# Patient Record
Sex: MALE | Race: WHITE | NOT HISPANIC OR LATINO | Employment: PART TIME | URBAN - NONMETROPOLITAN AREA
[De-identification: names, ages, dates, MRNs, and addresses within clinical notes are randomized per-mention and may not be internally consistent; named-entity substitution may affect disease eponyms.]

---

## 2018-07-26 ENCOUNTER — HOSPITAL ENCOUNTER (EMERGENCY)
Facility: HOSPITAL | Age: 58
Discharge: HOME | End: 2018-07-26
Attending: EMERGENCY MEDICINE | Admitting: EMERGENCY MEDICINE

## 2018-07-26 ENCOUNTER — APPOINTMENT (OUTPATIENT)
Dept: GENERAL RADIOLOGY | Facility: HOSPITAL | Age: 58
End: 2018-07-26

## 2018-07-26 VITALS
BODY MASS INDEX: 28 KG/M2 | HEIGHT: 71 IN | TEMPERATURE: 98.3 F | SYSTOLIC BLOOD PRESSURE: 144 MMHG | DIASTOLIC BLOOD PRESSURE: 67 MMHG | HEART RATE: 90 BPM | WEIGHT: 200 LBS | OXYGEN SATURATION: 97 % | RESPIRATION RATE: 14 BRPM

## 2018-07-26 DIAGNOSIS — S80.11XA CONTUSION OF LEG, RIGHT, INITIAL ENCOUNTER: Primary | ICD-10-CM

## 2018-07-26 PROCEDURE — 99283 EMERGENCY DEPT VISIT LOW MDM: CPT

## 2018-07-26 PROCEDURE — 73590 X-RAY EXAM OF LOWER LEG: CPT

## 2018-07-26 RX ORDER — CELECOXIB 200 MG/1
200 CAPSULE ORAL DAILY
COMMUNITY

## 2018-07-26 RX ORDER — GABAPENTIN 600 MG/1
600 TABLET ORAL 3 TIMES DAILY
COMMUNITY
End: 2022-03-23

## 2018-07-26 RX ORDER — IBUPROFEN 600 MG/1
600 TABLET ORAL EVERY 6 HOURS PRN
Qty: 60 TABLET | Refills: 0 | Status: SHIPPED | OUTPATIENT
Start: 2018-07-26 | End: 2018-12-14

## 2018-07-26 RX ORDER — BACLOFEN 10 MG/1
10 TABLET ORAL 2 TIMES DAILY
COMMUNITY

## 2018-07-26 RX ORDER — DULOXETIN HYDROCHLORIDE 60 MG/1
60 CAPSULE, DELAYED RELEASE ORAL DAILY
COMMUNITY
End: 2021-04-01

## 2018-11-13 ENCOUNTER — HOSPITAL ENCOUNTER (EMERGENCY)
Facility: HOSPITAL | Age: 58
Discharge: HOME | End: 2018-11-14
Attending: EMERGENCY MEDICINE | Admitting: EMERGENCY MEDICINE

## 2018-11-13 ENCOUNTER — APPOINTMENT (OUTPATIENT)
Dept: CT IMAGING | Facility: HOSPITAL | Age: 58
End: 2018-11-13

## 2018-11-13 DIAGNOSIS — N20.0 KIDNEY STONE: Primary | ICD-10-CM

## 2018-11-13 LAB
ALBUMIN SERPL-MCNC: 5.4 G/DL (ref 3.5–5)
ALBUMIN/GLOB SERPL: 1.7 G/DL (ref 1–2)
ALP SERPL-CCNC: 98 U/L (ref 38–126)
ALT SERPL W P-5'-P-CCNC: 47 U/L (ref 13–69)
ANION GAP SERPL CALCULATED.3IONS-SCNC: 18.4 MMOL/L (ref 10–20)
AST SERPL-CCNC: 47 U/L (ref 15–46)
BASOPHILS # BLD AUTO: 0.03 10*3/MM3 (ref 0–0.2)
BASOPHILS NFR BLD AUTO: 0.3 % (ref 0–2.5)
BILIRUB SERPL-MCNC: 0.9 MG/DL (ref 0.2–1.3)
BUN BLD-MCNC: 20 MG/DL (ref 7–20)
BUN/CREAT SERPL: 15.4 (ref 6.3–21.9)
CALCIUM SPEC-SCNC: 11.1 MG/DL (ref 8.4–10.2)
CHLORIDE SERPL-SCNC: 102 MMOL/L (ref 98–107)
CO2 SERPL-SCNC: 21 MMOL/L (ref 26–30)
CREAT BLD-MCNC: 1.3 MG/DL (ref 0.6–1.3)
DEPRECATED RDW RBC AUTO: 41.2 FL (ref 37–54)
EOSINOPHIL # BLD AUTO: 0.01 10*3/MM3 (ref 0–0.7)
EOSINOPHIL NFR BLD AUTO: 0.1 % (ref 0–7)
ERYTHROCYTE [DISTWIDTH] IN BLOOD BY AUTOMATED COUNT: 11.9 % (ref 11.5–14.5)
GFR SERPL CREATININE-BSD FRML MDRD: 57 ML/MIN/1.73
GLOBULIN UR ELPH-MCNC: 3.1 GM/DL
GLUCOSE BLD-MCNC: 118 MG/DL (ref 74–98)
HCT VFR BLD AUTO: 45.6 % (ref 42–52)
HGB BLD-MCNC: 15.9 G/DL (ref 14–18)
IMM GRANULOCYTES # BLD: 0.05 10*3/MM3 (ref 0–0.06)
IMM GRANULOCYTES NFR BLD: 0.4 % (ref 0–0.6)
LIPASE SERPL-CCNC: 88 U/L (ref 23–300)
LYMPHOCYTES # BLD AUTO: 1.58 10*3/MM3 (ref 0.6–3.4)
LYMPHOCYTES NFR BLD AUTO: 13.5 % (ref 10–50)
MCH RBC QN AUTO: 32.6 PG (ref 27–31)
MCHC RBC AUTO-ENTMCNC: 34.9 G/DL (ref 30–37)
MCV RBC AUTO: 93.6 FL (ref 80–94)
MONOCYTES # BLD AUTO: 0.74 10*3/MM3 (ref 0–0.9)
MONOCYTES NFR BLD AUTO: 6.3 % (ref 0–12)
NEUTROPHILS # BLD AUTO: 9.28 10*3/MM3 (ref 2–6.9)
NEUTROPHILS NFR BLD AUTO: 79.4 % (ref 37–80)
NRBC BLD MANUAL-RTO: 0 /100 WBC (ref 0–0)
PLATELET # BLD AUTO: 339 10*3/MM3 (ref 130–400)
PMV BLD AUTO: 8.3 FL (ref 6–12)
POTASSIUM BLD-SCNC: 4.4 MMOL/L (ref 3.5–5.1)
PROT SERPL-MCNC: 8.5 G/DL (ref 6.3–8.2)
RBC # BLD AUTO: 4.87 10*6/MM3 (ref 4.7–6.1)
SODIUM BLD-SCNC: 137 MMOL/L (ref 137–145)
WBC NRBC COR # BLD: 11.69 10*3/MM3 (ref 4.8–10.8)

## 2018-11-13 PROCEDURE — 83690 ASSAY OF LIPASE: CPT | Performed by: EMERGENCY MEDICINE

## 2018-11-13 PROCEDURE — 74176 CT ABD & PELVIS W/O CONTRAST: CPT

## 2018-11-13 PROCEDURE — 85025 COMPLETE CBC W/AUTO DIFF WBC: CPT | Performed by: EMERGENCY MEDICINE

## 2018-11-13 PROCEDURE — 25010000002 KETOROLAC TROMETHAMINE PER 15 MG: Performed by: EMERGENCY MEDICINE

## 2018-11-13 PROCEDURE — 96374 THER/PROPH/DIAG INJ IV PUSH: CPT

## 2018-11-13 PROCEDURE — 99284 EMERGENCY DEPT VISIT MOD MDM: CPT

## 2018-11-13 PROCEDURE — 96375 TX/PRO/DX INJ NEW DRUG ADDON: CPT

## 2018-11-13 PROCEDURE — 80053 COMPREHEN METABOLIC PANEL: CPT | Performed by: EMERGENCY MEDICINE

## 2018-11-13 PROCEDURE — 25010000002 MORPHINE PER 10 MG: Performed by: EMERGENCY MEDICINE

## 2018-11-13 PROCEDURE — 96361 HYDRATE IV INFUSION ADD-ON: CPT

## 2018-11-13 PROCEDURE — 25010000002 ONDANSETRON PER 1 MG: Performed by: EMERGENCY MEDICINE

## 2018-11-13 RX ORDER — SODIUM CHLORIDE 0.9 % (FLUSH) 0.9 %
10 SYRINGE (ML) INJECTION AS NEEDED
Status: DISCONTINUED | OUTPATIENT
Start: 2018-11-13 | End: 2018-11-14 | Stop reason: HOSPADM

## 2018-11-13 RX ORDER — ONDANSETRON 2 MG/ML
4 INJECTION INTRAMUSCULAR; INTRAVENOUS ONCE
Status: COMPLETED | OUTPATIENT
Start: 2018-11-13 | End: 2018-11-13

## 2018-11-13 RX ORDER — MORPHINE SULFATE 4 MG/ML
4 INJECTION, SOLUTION INTRAMUSCULAR; INTRAVENOUS ONCE
Status: COMPLETED | OUTPATIENT
Start: 2018-11-13 | End: 2018-11-13

## 2018-11-13 RX ORDER — KETOROLAC TROMETHAMINE 30 MG/ML
10 INJECTION, SOLUTION INTRAMUSCULAR; INTRAVENOUS ONCE
Status: COMPLETED | OUTPATIENT
Start: 2018-11-13 | End: 2018-11-13

## 2018-11-13 RX ADMIN — MORPHINE SULFATE 4 MG: 4 INJECTION, SOLUTION INTRAMUSCULAR; INTRAVENOUS at 23:21

## 2018-11-13 RX ADMIN — ONDANSETRON 4 MG: 2 INJECTION INTRAMUSCULAR; INTRAVENOUS at 23:21

## 2018-11-13 RX ADMIN — KETOROLAC TROMETHAMINE 10 MG: 30 INJECTION, SOLUTION INTRAMUSCULAR at 23:21

## 2018-11-13 RX ADMIN — SODIUM CHLORIDE 1000 ML: 9 INJECTION, SOLUTION INTRAVENOUS at 23:21

## 2018-11-14 VITALS
RESPIRATION RATE: 16 BRPM | OXYGEN SATURATION: 95 % | BODY MASS INDEX: 15.26 KG/M2 | HEIGHT: 71 IN | SYSTOLIC BLOOD PRESSURE: 117 MMHG | HEART RATE: 81 BPM | DIASTOLIC BLOOD PRESSURE: 71 MMHG | WEIGHT: 109 LBS | TEMPERATURE: 97.2 F

## 2018-11-14 LAB
BACTERIA UR QL AUTO: ABNORMAL /HPF
BILIRUB UR QL STRIP: NEGATIVE
CLARITY UR: CLEAR
COLOR UR: YELLOW
GLUCOSE UR STRIP-MCNC: NEGATIVE MG/DL
HGB UR QL STRIP.AUTO: ABNORMAL
HOLD SPECIMEN: NORMAL
HOLD SPECIMEN: NORMAL
HYALINE CASTS UR QL AUTO: ABNORMAL /LPF
KETONES UR QL STRIP: ABNORMAL
LEUKOCYTE ESTERASE UR QL STRIP.AUTO: NEGATIVE
NITRITE UR QL STRIP: NEGATIVE
PH UR STRIP.AUTO: 7.5 [PH] (ref 5–8)
PROT UR QL STRIP: NEGATIVE
RBC # UR: ABNORMAL /HPF
REF LAB TEST METHOD: ABNORMAL
SP GR UR STRIP: 1.01 (ref 1–1.03)
SQUAMOUS #/AREA URNS HPF: ABNORMAL /HPF
UROBILINOGEN UR QL STRIP: ABNORMAL
WBC UR QL AUTO: ABNORMAL /HPF
WHOLE BLOOD HOLD SPECIMEN: NORMAL
WHOLE BLOOD HOLD SPECIMEN: NORMAL

## 2018-11-14 PROCEDURE — 81001 URINALYSIS AUTO W/SCOPE: CPT | Performed by: EMERGENCY MEDICINE

## 2018-11-14 PROCEDURE — 25010000002 KETOROLAC TROMETHAMINE PER 15 MG: Performed by: EMERGENCY MEDICINE

## 2018-11-14 PROCEDURE — 96376 TX/PRO/DX INJ SAME DRUG ADON: CPT

## 2018-11-14 PROCEDURE — 25010000002 MORPHINE PER 10 MG: Performed by: EMERGENCY MEDICINE

## 2018-11-14 RX ORDER — OXYCODONE HYDROCHLORIDE AND ACETAMINOPHEN 5; 325 MG/1; MG/1
1 TABLET ORAL EVERY 4 HOURS PRN
Qty: 12 TABLET | Refills: 0 | Status: SHIPPED | OUTPATIENT
Start: 2018-11-14 | End: 2018-12-14

## 2018-11-14 RX ORDER — NAPROXEN 500 MG/1
500 TABLET ORAL 2 TIMES DAILY PRN
Qty: 14 TABLET | Refills: 0 | Status: ON HOLD | OUTPATIENT
Start: 2018-11-14 | End: 2018-12-14

## 2018-11-14 RX ORDER — TAMSULOSIN HYDROCHLORIDE 0.4 MG/1
1 CAPSULE ORAL DAILY
Qty: 30 CAPSULE | Refills: 0 | Status: SHIPPED | OUTPATIENT
Start: 2018-11-14 | End: 2018-12-14

## 2018-11-14 RX ORDER — MORPHINE SULFATE 4 MG/ML
4 INJECTION, SOLUTION INTRAMUSCULAR; INTRAVENOUS ONCE
Status: COMPLETED | OUTPATIENT
Start: 2018-11-14 | End: 2018-11-14

## 2018-11-14 RX ORDER — KETOROLAC TROMETHAMINE 30 MG/ML
10 INJECTION, SOLUTION INTRAMUSCULAR; INTRAVENOUS ONCE
Status: COMPLETED | OUTPATIENT
Start: 2018-11-14 | End: 2018-11-14

## 2018-11-14 RX ORDER — ONDANSETRON 4 MG/1
4 TABLET, ORALLY DISINTEGRATING ORAL EVERY 8 HOURS PRN
Qty: 12 TABLET | Refills: 0 | Status: SHIPPED | OUTPATIENT
Start: 2018-11-14 | End: 2021-04-01

## 2018-11-14 RX ADMIN — MORPHINE SULFATE 4 MG: 4 INJECTION, SOLUTION INTRAMUSCULAR; INTRAVENOUS at 02:24

## 2018-11-14 RX ADMIN — KETOROLAC TROMETHAMINE 10 MG: 30 INJECTION, SOLUTION INTRAMUSCULAR at 02:23

## 2018-11-14 NOTE — ED PROVIDER NOTES
TRIAGE CHIEF COMPLAINT:     Nursing and triage notes reviewed    Chief Complaint   Patient presents with   • Flank Pain   • Testicle Pain      HPI: Toribio Dunaway is a 58 y.o. male who presents to the emergency department complaining of left-sided flank pain.  For approximately the past 12 hours patient has had a sharp stabbing left flank pain radiating around to the groin and left testicle.  Patient has had nausea with nonbilious emesis.  Has had urinary frequency as well.  Denies fevers or chills.  Has never had similar symptoms in the past.  Denies diarrhea.  No chest pain or shortness of breath.     REVIEW OF SYSTEMS: All other systems reviewed and are negative     PAST MEDICAL HISTORY:   Past Medical History:   Diagnosis Date   • GERD (gastroesophageal reflux disease)    • Injury of back    • RA (rheumatoid arthritis) (CMS/[facility])         FAMILY HISTORY:   History reviewed. No pertinent family history.     SOCIAL HISTORY:   Social History     Socioeconomic History   • Marital status:      Spouse name: Not on file   • Number of children: Not on file   • Years of education: Not on file   • Highest education level: Not on file   Social Needs   • Financial resource strain: Not on file   • Food insecurity - worry: Not on file   • Food insecurity - inability: Not on file   • Transportation needs - medical: Not on file   • Transportation needs - non-medical: Not on file   Occupational History   • Not on file   Tobacco Use   • Smoking status: Never Smoker   • Smokeless tobacco: Never Used   Substance and Sexual Activity   • Alcohol use: No   • Drug use: No   • Sexual activity: Not on file   Other Topics Concern   • Not on file   Social History Narrative   • Not on file        SURGICAL HISTORY:   Past Surgical History:   Procedure Laterality Date   • COLONOSCOPY     • LASIK     • TESTICLE SURGERY          CURRENT MEDICATIONS:      Medication List      ASK your doctor about these medications    baclofen 10 MG  tablet  Commonly known as:  LIORESAL     celecoxib 200 MG capsule  Commonly known as:  CeleBREX     DULoxetine 60 MG capsule  Commonly known as:  CYMBALTA     gabapentin 600 MG tablet  Commonly known as:  NEURONTIN     ibuprofen 600 MG tablet  Commonly known as:  ADVIL,MOTRIN  Take 1 tablet by mouth Every 6 (Six) Hours As Needed for Mild Pain .     TRAMADOL HCL PO           ALLERGIES: Patient has no known allergies.     PHYSICAL EXAM:   VITAL SIGNS:   Vitals:    11/13/18 2114   BP: 124/81   Pulse: 93   Resp: 22   Temp: 97.2 °F (36.2 °C)   SpO2: 99%      CONSTITUTIONAL: Awake, oriented, appears non-toxic but very uncomfortable  HENT: Atraumatic, normocephalic, oral mucosa pink and moist, airway patent.   EYES: Conjunctiva clear  NECK: Trachea midline  CARDIOVASCULAR: Normal heart rate, Normal rhythm, No murmurs, rubs, gallops   PULMONARY/CHEST: Clear to auscultation, no rhonchi, wheezes, or rales. Symmetrical breath sounds.  ABDOMINAL: Non-distended, soft, left flank tenderness to palpation - no rebound or guarding.  NEUROLOGIC: Non-focal, moving all four extremities, no gross sensory or motor deficits.   EXTREMITIES: No clubbing, cyanosis, or edema   SKIN: Warm, Dry, No erythema, No rash     ED COURSE / MEDICAL DECISION MAKING:   Toribio Dunaway is a 58 y.o. male who presents to the emergency department for evaluation of left flank pain.  Patient appears very uncomfortable on arrival in the emergency department but is nontoxic.  Vital signs are stable.  Exam does reveal left flank tenderness to palpation.  Given his presentation I suspect a likely kidney stone.  We'll obtain a CT scan as well as labs for further evaluation.  We'll treat patient with pain and nausea medication.  Laboratory tests reveal some hematuria but otherwise are largely unremarkable.  CT scan reveals a 2 mm distal left ureteral stone which I believe is the cause of patient's symptoms.  He was given a dose of Toradol and morphine with complete  resolution of his pain.  Patient able to tolerate by mouth without difficulty.  We'll continue to treat patient symptomatically as an outpatient.  Return precautions discussed.    DECISION TO DISCHARGE/ADMIT: see ED care timeline     FINAL IMPRESSION:   1 -- kidney stone   2 --   3 --     Electronically signed by: Makenna Barlow MD, 11/13/2018 11:14 PM       Makenna Barlow MD  11/14/18 0145

## 2018-11-14 NOTE — ED NOTES
Pt still unable to void; refuses in/out cath; will continue to monitor      Imelda Mensah, MIRIAN  11/14/18 0037

## 2018-11-14 NOTE — ED NOTES
Pt has attempted to urinate using urinal at bedside; states he is unable to and is in too much pain at this time; will monitor and try again in 10-15 mins;     Imelda Mensah, RN  11/13/18 5824

## 2018-12-14 ENCOUNTER — APPOINTMENT (OUTPATIENT)
Dept: GENERAL RADIOLOGY | Facility: HOSPITAL | Age: 58
End: 2018-12-14

## 2018-12-14 ENCOUNTER — HOSPITAL ENCOUNTER (INPATIENT)
Facility: HOSPITAL | Age: 58
LOS: 4 days | Discharge: HOME | End: 2018-12-18
Attending: EMERGENCY MEDICINE | Admitting: FAMILY MEDICINE

## 2018-12-14 DIAGNOSIS — J18.9 COMMUNITY ACQUIRED PNEUMONIA OF LEFT LOWER LOBE OF LUNG: Primary | ICD-10-CM

## 2018-12-14 PROBLEM — N17.9 ACUTE KIDNEY INJURY (HCC): Status: ACTIVE | Noted: 2018-12-14

## 2018-12-14 PROBLEM — M06.9 RA (RHEUMATOID ARTHRITIS) (HCC): Status: ACTIVE | Noted: 2018-12-14

## 2018-12-14 PROBLEM — E86.0 DEHYDRATION: Status: ACTIVE | Noted: 2018-12-14

## 2018-12-14 PROBLEM — K21.9 GERD (GASTROESOPHAGEAL REFLUX DISEASE): Status: ACTIVE | Noted: 2018-12-14

## 2018-12-14 LAB
ALBUMIN SERPL-MCNC: 4.5 G/DL (ref 3.5–5)
ALBUMIN/GLOB SERPL: 1.3 G/DL (ref 1–2)
ALP SERPL-CCNC: 123 U/L (ref 38–126)
ALT SERPL W P-5'-P-CCNC: 38 U/L (ref 13–69)
ANION GAP SERPL CALCULATED.3IONS-SCNC: 21.1 MMOL/L (ref 10–20)
AST SERPL-CCNC: 46 U/L (ref 15–46)
BASOPHILS # BLD AUTO: 0.06 10*3/MM3 (ref 0–0.2)
BASOPHILS NFR BLD AUTO: 0.3 % (ref 0–2.5)
BILIRUB SERPL-MCNC: 1.3 MG/DL (ref 0.2–1.3)
BUN BLD-MCNC: 46 MG/DL (ref 7–20)
BUN/CREAT SERPL: 14.4 (ref 6.3–21.9)
CALCIUM SPEC-SCNC: 9.9 MG/DL (ref 8.4–10.2)
CHLORIDE SERPL-SCNC: 99 MMOL/L (ref 98–107)
CO2 SERPL-SCNC: 22 MMOL/L (ref 26–30)
CREAT BLD-MCNC: 3.2 MG/DL (ref 0.6–1.3)
D-LACTATE SERPL-SCNC: 0.7 MMOL/L (ref 0.5–2)
DEPRECATED RDW RBC AUTO: 42.5 FL (ref 37–54)
EOSINOPHIL # BLD AUTO: 0.01 10*3/MM3 (ref 0–0.7)
EOSINOPHIL NFR BLD AUTO: 0 % (ref 0–7)
ERYTHROCYTE [DISTWIDTH] IN BLOOD BY AUTOMATED COUNT: 12.2 % (ref 11.5–14.5)
FLUAV AG NPH QL: NEGATIVE
FLUBV AG NPH QL IA: NEGATIVE
GFR SERPL CREATININE-BSD FRML MDRD: 20 ML/MIN/1.73
GLOBULIN UR ELPH-MCNC: 3.6 GM/DL
GLUCOSE BLD-MCNC: 115 MG/DL (ref 74–98)
HCT VFR BLD AUTO: 35 % (ref 42–52)
HGB BLD-MCNC: 12 G/DL (ref 14–18)
IMM GRANULOCYTES # BLD: 0.42 10*3/MM3 (ref 0–0.06)
IMM GRANULOCYTES NFR BLD: 2 % (ref 0–0.6)
LYMPHOCYTES # BLD AUTO: 0.74 10*3/MM3 (ref 0.6–3.4)
LYMPHOCYTES NFR BLD AUTO: 3.5 % (ref 10–50)
MCH RBC QN AUTO: 32.3 PG (ref 27–31)
MCHC RBC AUTO-ENTMCNC: 34.3 G/DL (ref 30–37)
MCV RBC AUTO: 94.1 FL (ref 80–94)
MONOCYTES # BLD AUTO: 1.46 10*3/MM3 (ref 0–0.9)
MONOCYTES NFR BLD AUTO: 6.9 % (ref 0–12)
NEUTROPHILS # BLD AUTO: 18.32 10*3/MM3 (ref 2–6.9)
NEUTROPHILS NFR BLD AUTO: 87.3 % (ref 37–80)
NRBC BLD MANUAL-RTO: 0 /100 WBC (ref 0–0)
PLATELET # BLD AUTO: 343 10*3/MM3 (ref 130–400)
PMV BLD AUTO: 8.5 FL (ref 6–12)
POTASSIUM BLD-SCNC: 4.1 MMOL/L (ref 3.5–5.1)
PROT SERPL-MCNC: 8.1 G/DL (ref 6.3–8.2)
RBC # BLD AUTO: 3.72 10*6/MM3 (ref 4.7–6.1)
SODIUM BLD-SCNC: 138 MMOL/L (ref 137–145)
WBC NRBC COR # BLD: 21.01 10*3/MM3 (ref 4.8–10.8)

## 2018-12-14 PROCEDURE — 99219 PR INITIAL OBSERVATION CARE/DAY 50 MINUTES: CPT | Performed by: INTERNAL MEDICINE

## 2018-12-14 PROCEDURE — G0378 HOSPITAL OBSERVATION PER HR: HCPCS

## 2018-12-14 PROCEDURE — 94799 UNLISTED PULMONARY SVC/PX: CPT

## 2018-12-14 PROCEDURE — 25010000002 AZITHROMYCIN: Performed by: NURSE PRACTITIONER

## 2018-12-14 PROCEDURE — 25010000002 CEFTRIAXONE SODIUM-DEXTROSE 1-3.74 GM-%(50ML) RECONSTITUTED SOLUTION: Performed by: NURSE PRACTITIONER

## 2018-12-14 PROCEDURE — 85025 COMPLETE CBC W/AUTO DIFF WBC: CPT | Performed by: NURSE PRACTITIONER

## 2018-12-14 PROCEDURE — 83605 ASSAY OF LACTIC ACID: CPT | Performed by: NURSE PRACTITIONER

## 2018-12-14 PROCEDURE — 87040 BLOOD CULTURE FOR BACTERIA: CPT | Performed by: NURSE PRACTITIONER

## 2018-12-14 PROCEDURE — 87804 INFLUENZA ASSAY W/OPTIC: CPT | Performed by: NURSE PRACTITIONER

## 2018-12-14 PROCEDURE — 71046 X-RAY EXAM CHEST 2 VIEWS: CPT

## 2018-12-14 PROCEDURE — 94640 AIRWAY INHALATION TREATMENT: CPT

## 2018-12-14 PROCEDURE — 99284 EMERGENCY DEPT VISIT MOD MDM: CPT

## 2018-12-14 PROCEDURE — 87040 BLOOD CULTURE FOR BACTERIA: CPT

## 2018-12-14 PROCEDURE — 80053 COMPREHEN METABOLIC PANEL: CPT | Performed by: NURSE PRACTITIONER

## 2018-12-14 RX ORDER — DULOXETIN HYDROCHLORIDE 30 MG/1
60 CAPSULE, DELAYED RELEASE ORAL DAILY
Status: DISCONTINUED | OUTPATIENT
Start: 2018-12-15 | End: 2018-12-18 | Stop reason: HOSPADM

## 2018-12-14 RX ORDER — SODIUM CHLORIDE 9 MG/ML
75 INJECTION, SOLUTION INTRAVENOUS CONTINUOUS
Status: DISCONTINUED | OUTPATIENT
Start: 2018-12-14 | End: 2018-12-18

## 2018-12-14 RX ORDER — BACLOFEN 10 MG/1
10 TABLET ORAL 2 TIMES DAILY
Status: DISCONTINUED | OUTPATIENT
Start: 2018-12-14 | End: 2018-12-18 | Stop reason: HOSPADM

## 2018-12-14 RX ORDER — CEFTRIAXONE 1 G/50ML
1 INJECTION, SOLUTION INTRAVENOUS EVERY 24 HOURS
Status: DISCONTINUED | OUTPATIENT
Start: 2018-12-15 | End: 2018-12-16

## 2018-12-14 RX ORDER — ONDANSETRON 4 MG/1
4 TABLET, ORALLY DISINTEGRATING ORAL EVERY 8 HOURS PRN
Status: DISCONTINUED | OUTPATIENT
Start: 2018-12-14 | End: 2018-12-18 | Stop reason: HOSPADM

## 2018-12-14 RX ORDER — IPRATROPIUM BROMIDE AND ALBUTEROL SULFATE 2.5; .5 MG/3ML; MG/3ML
3 SOLUTION RESPIRATORY (INHALATION)
Status: DISCONTINUED | OUTPATIENT
Start: 2018-12-14 | End: 2018-12-18

## 2018-12-14 RX ORDER — CEFTRIAXONE 1 G/50ML
1 INJECTION, SOLUTION INTRAVENOUS ONCE
Status: COMPLETED | OUTPATIENT
Start: 2018-12-14 | End: 2018-12-14

## 2018-12-14 RX ADMIN — IPRATROPIUM BROMIDE AND ALBUTEROL SULFATE 3 ML: .5; 3 SOLUTION RESPIRATORY (INHALATION) at 22:41

## 2018-12-14 RX ADMIN — CEFTRIAXONE 1 G: 1 INJECTION, SOLUTION INTRAVENOUS at 18:56

## 2018-12-14 RX ADMIN — AZITHROMYCIN 500 MG: 500 INJECTION, POWDER, LYOPHILIZED, FOR SOLUTION INTRAVENOUS at 20:08

## 2018-12-14 RX ADMIN — SODIUM CHLORIDE 2445 ML: 9 INJECTION, SOLUTION INTRAVENOUS at 18:56

## 2018-12-14 RX ADMIN — SODIUM CHLORIDE 1000 ML: 9 INJECTION, SOLUTION INTRAVENOUS at 17:37

## 2018-12-14 RX ADMIN — ONDANSETRON 4 MG: 4 TABLET, ORALLY DISINTEGRATING ORAL at 21:26

## 2018-12-14 RX ADMIN — BACLOFEN 10 MG: 10 TABLET ORAL at 21:26

## 2018-12-14 NOTE — ED PROVIDER NOTES
"Subjective   58-year-old male patient sent the emergency room with a two-week history of productive cough, low-grade fever, decreased by mouth intake, generalized body aches.  She denies any nausea or vomiting.  Office productive of thick yellow sometimes brown sputum.  Occasionally streaked with hemoptysis.  No sick contacts that he is aware of.  He is unsure as to how high his fevers as he has not yet taken it.  He just \"knows have had one\".  He denies cardiac chest pain.  Does complain of some pleuritic pain with deep inspiration.  No Palpitations.            Review of Systems  A 10 point review of systems including constitutional, ENT, cardiovascular, respiratory, GI, , musculoskeletal, neuro, skin, psychiatric was performed and it was negative with exception lies body aches, productive cough, low-grade fever decreased intake   Past Medical History:   Diagnosis Date   • GERD (gastroesophageal reflux disease)    • Injury of back    • Kidney stone    • RA (rheumatoid arthritis) (CMS/[facility])        No Known Allergies    Past Surgical History:   Procedure Laterality Date   • COLONOSCOPY     • LASIK     • TESTICLE SURGERY         History reviewed. No pertinent family history.    Social History     Socioeconomic History   • Marital status:      Spouse name: Not on file   • Number of children: Not on file   • Years of education: Not on file   • Highest education level: Not on file   Tobacco Use   • Smoking status: Never Smoker   • Smokeless tobacco: Never Used   Substance and Sexual Activity   • Alcohol use: No   • Drug use: No           Objective   Physical Exam  Constitutional: The patient is cooperative. Appears well-developed and well-nourished.  Ill-appearing but nontoxic  Psychological: Alert and oriented x3. No abnormalities of mood affect.  Eyes: Pupils are equal round reactive to light.  Conjunctiva are within normal limits.  ENT: Oropharynx is clear.  Neck: The neck is supple.  No crepitus.  Full range " of motion without pain.  Chest: There is no tenderness to the chest wall.  Respiratory: Respiratory effort was normal.  There is no rales, wheezes, or rhonchi.  There is no stridor.  Air entry is equal.  Cardiovascular: Regular rate and rhythm, no murmurs, gallops, rubs.  Capillary refill is brisk.  Gastrointestinal: Abdomen soft, nontender, nondistended.  There is no rebound tenderness or guarding.  No organomegaly is noted.  Genitourinary: Not examined  Lymphatic: No gross lymphadenopathy noted.  Musculoskeletal: Musculoskeletal system is grossly intact.  There is no obvious deformity.  Neurological: Jana Coma Scale is 15.  Gross motor movement is intact in all 4 extremities.  Patient exhibits normal speech.  Skin: Skin is normal to inspection and palpation.  Warm and dry.  No obvious bruising.  No obvious rash.    Procedures  Lab Results (last 24 hours)     Procedure Component Value Units Date/Time    Comprehensive Metabolic Panel [[id number]]  (Abnormal) Collected:  12/14/18 1737    Specimen:  Blood Updated:  12/14/18 1757     Glucose 115 mg/dL      BUN 46 mg/dL      Creatinine 3.20 mg/dL      Sodium 138 mmol/L      Potassium 4.1 mmol/L      Chloride 99 mmol/L      CO2 22.0 mmol/L      Calcium 9.9 mg/dL      Total Protein 8.1 g/dL      Albumin 4.50 g/dL      ALT (SGPT) 38 U/L      AST (SGOT) 46 U/L      Alkaline Phosphatase 123 U/L      Total Bilirubin 1.3 mg/dL      eGFR Non African Amer 20 mL/min/1.73      Globulin 3.6 gm/dL      A/G Ratio 1.3 g/dL      BUN/Creatinine Ratio 14.4     Anion Gap 21.1 mmol/L     Narrative:       GFR Normal >60  Chronic Kidney Disease <60  Kidney Failure <15    Influenza Antigen, Rapid - Swab, Nasopharynx [[id number]]  (Normal) Collected:  12/14/18 1737    Specimen:  Swab from Nasopharynx Updated:  12/14/18 1754     Influenza A Ag, EIA Negative     Influenza B Ag, EIA Negative    CBC & Differential [[id number]] Collected:  12/14/18 1737    Specimen:  Blood Updated:  12/14/18 7835     Narrative:       The following orders were created for panel order CBC & Differential.  Procedure                               Abnormality         Status                     ---------                               -----------         ------                     CBC Auto Differential[[id number]]        Abnormal            Final result                 Please view results for these tests on the individual orders.    CBC Auto Differential [[id number]]  (Abnormal) Collected:  12/14/18 1737    Specimen:  Blood Updated:  12/14/18 1745     WBC 21.01 10*3/mm3      RBC 3.72 10*6/mm3      Hemoglobin 12.0 g/dL      Hematocrit 35.0 %      MCV 94.1 fL      MCH 32.3 pg      MCHC 34.3 g/dL      RDW 12.2 %      RDW-SD 42.5 fl      MPV 8.5 fL      Platelets 343 10*3/mm3      Neutrophil % 87.3 %      Lymphocyte % 3.5 %      Monocyte % 6.9 %      Eosinophil % 0.0 %      Basophil % 0.3 %      Immature Grans % 2.0 %      Neutrophils, Absolute 18.32 10*3/mm3      Lymphocytes, Absolute 0.74 10*3/mm3      Monocytes, Absolute 1.46 10*3/mm3      Eosinophils, Absolute 0.01 10*3/mm3      Basophils, Absolute 0.06 10*3/mm3      Immature Grans, Absolute 0.42 10*3/mm3      nRBC 0.0 /100 WBC           Imaging Results (last 24 hours)     Procedure Component Value Units Date/Time    XR Chest 2 View [[id number]] Collected:  12/14/18 1738     Updated:  12/14/18 1739    Narrative:       FINAL REPORT    CLINICAL HISTORY:  soa, cough    COMPARISON:  None.    FINDINGS:  There is a hazy left lung and in frontal view.  Lateral view  demonstrates increased density  across the lower  thoracic spine  and findings are highly suggestive of a developing  consolidation in the posterior left lung base. No effusion or  pneumothorax. Heart size is normal. No acute bony abnormality.      Impression:       Findings suggestive of a developing consolidation in the  posterior left lung base  that could represent a pneumonia given  the history.  Appropriate treatment and  followup x-rays in 6  weeks is recommended to ensure proper resolution.    Authenticated by Madhu Reddy MD on 12/14/2018 05:38:45 PM               ED Course  ED Course as of Dec 14 1821   Fri Dec 14, 2018   1759 Anion Gap: (!) 21.1 [EB]      ED Course User Index  [EB] Amy Thurston, APRN      Patient's laboratory studies significant for elevated BUN/creatinine indicating acute kidney injury, wbc's.  Chest x-ray revealed left lung base consolidation consistent with pneumonia.  Patient scores a 2 on a curb 65 pneumonia severity scoring right ear area, placing him in a moderate severity category with a risk of death being around 9%.  This information and felt to be prudent patient be admitted for IV antibiotics and further evaluation and treatment.  Discussed case with the hospitalist (Dr. Grier) was agreed to accept the patient for admission.  Plan care discussed with the patient who verbalized understanding was in agreement.  The admission as been arranged.  Antibiotics will be initiated once serum lactate and blood cultures have been obtained.            MDM      Final diagnoses:   Community acquired pneumonia of left lower lobe of lung (CMS/HCC)            Amy Thurston, APRN  12/14/18 1821

## 2018-12-14 NOTE — ED NOTES
Requested Med-Surg bed from padmini Richards, @ 1822. Will call back w/ bed assignment.      Eliz Estes  12/14/18 1825

## 2018-12-15 LAB
ALBUMIN SERPL-MCNC: 3.4 G/DL (ref 3.5–5)
ALBUMIN/GLOB SERPL: 1.1 G/DL (ref 1–2)
ALP SERPL-CCNC: 97 U/L (ref 38–126)
ALT SERPL W P-5'-P-CCNC: 43 U/L (ref 13–69)
ANION GAP SERPL CALCULATED.3IONS-SCNC: 15.8 MMOL/L (ref 10–20)
AST SERPL-CCNC: 39 U/L (ref 15–46)
BILIRUB SERPL-MCNC: 0.9 MG/DL (ref 0.2–1.3)
BUN BLD-MCNC: 42 MG/DL (ref 7–20)
BUN/CREAT SERPL: 14.5 (ref 6.3–21.9)
CALCIUM SPEC-SCNC: 8.8 MG/DL (ref 8.4–10.2)
CHLORIDE SERPL-SCNC: 107 MMOL/L (ref 98–107)
CO2 SERPL-SCNC: 22 MMOL/L (ref 26–30)
CREAT BLD-MCNC: 2.9 MG/DL (ref 0.6–1.3)
DEPRECATED RDW RBC AUTO: 43.4 FL (ref 37–54)
ERYTHROCYTE [DISTWIDTH] IN BLOOD BY AUTOMATED COUNT: 12.5 % (ref 11.5–14.5)
GFR SERPL CREATININE-BSD FRML MDRD: 22 ML/MIN/1.73
GLOBULIN UR ELPH-MCNC: 3 GM/DL
GLUCOSE BLD-MCNC: 102 MG/DL (ref 74–98)
HCT VFR BLD AUTO: 29.2 % (ref 42–52)
HGB BLD-MCNC: 9.9 G/DL (ref 14–18)
MCH RBC QN AUTO: 32.2 PG (ref 27–31)
MCHC RBC AUTO-ENTMCNC: 33.9 G/DL (ref 30–37)
MCV RBC AUTO: 95.1 FL (ref 80–94)
PLATELET # BLD AUTO: 286 10*3/MM3 (ref 130–400)
PMV BLD AUTO: 8.9 FL (ref 6–12)
POTASSIUM BLD-SCNC: 3.8 MMOL/L (ref 3.5–5.1)
PROT SERPL-MCNC: 6.4 G/DL (ref 6.3–8.2)
RBC # BLD AUTO: 3.07 10*6/MM3 (ref 4.7–6.1)
SODIUM BLD-SCNC: 141 MMOL/L (ref 137–145)
WBC NRBC COR # BLD: 16.49 10*3/MM3 (ref 4.8–10.8)

## 2018-12-15 PROCEDURE — 80053 COMPREHEN METABOLIC PANEL: CPT | Performed by: INTERNAL MEDICINE

## 2018-12-15 PROCEDURE — 94799 UNLISTED PULMONARY SVC/PX: CPT

## 2018-12-15 PROCEDURE — 25010000002 CEFTRIAXONE SODIUM-DEXTROSE 1-3.74 GM-%(50ML) RECONSTITUTED SOLUTION: Performed by: INTERNAL MEDICINE

## 2018-12-15 PROCEDURE — 85027 COMPLETE CBC AUTOMATED: CPT | Performed by: INTERNAL MEDICINE

## 2018-12-15 PROCEDURE — 99232 SBSQ HOSP IP/OBS MODERATE 35: CPT | Performed by: HOSPITALIST

## 2018-12-15 PROCEDURE — 25010000002 AZITHROMYCIN: Performed by: INTERNAL MEDICINE

## 2018-12-15 RX ADMIN — BACLOFEN 10 MG: 10 TABLET ORAL at 20:56

## 2018-12-15 RX ADMIN — CEFTRIAXONE 1 G: 1 INJECTION, SOLUTION INTRAVENOUS at 18:14

## 2018-12-15 RX ADMIN — SODIUM CHLORIDE 125 ML/HR: 9 INJECTION, SOLUTION INTRAVENOUS at 10:54

## 2018-12-15 RX ADMIN — SODIUM CHLORIDE 125 ML/HR: 9 INJECTION, SOLUTION INTRAVENOUS at 18:10

## 2018-12-15 RX ADMIN — AZITHROMYCIN 500 MG: 500 INJECTION, POWDER, LYOPHILIZED, FOR SOLUTION INTRAVENOUS at 20:56

## 2018-12-15 RX ADMIN — BACLOFEN 10 MG: 10 TABLET ORAL at 09:28

## 2018-12-15 RX ADMIN — IPRATROPIUM BROMIDE AND ALBUTEROL SULFATE 3 ML: .5; 3 SOLUTION RESPIRATORY (INHALATION) at 12:04

## 2018-12-15 RX ADMIN — DULOXETINE HYDROCHLORIDE 60 MG: 30 CAPSULE, DELAYED RELEASE ORAL at 09:28

## 2018-12-15 RX ADMIN — IPRATROPIUM BROMIDE AND ALBUTEROL SULFATE 3 ML: .5; 3 SOLUTION RESPIRATORY (INHALATION) at 07:01

## 2018-12-15 NOTE — PLAN OF CARE
Problem: Patient Care Overview  Goal: Plan of Care Review  Outcome: Ongoing (interventions implemented as appropriate)   12/14/18 1876   Coping/Psychosocial   Plan of Care Reviewed With patient   OTHER   Outcome Summary New admission

## 2018-12-15 NOTE — PLAN OF CARE
Problem: Pneumonia (Adult)  Goal: Signs and Symptoms of Listed Potential Problems Will be Absent, Minimized or Managed (Pneumonia)  Outcome: Ongoing (interventions implemented as appropriate)   12/15/18 3316   Goal/Outcome Evaluation   Problems Assessed (Pneumonia) all   Problems Present (Pneumonia) none

## 2018-12-15 NOTE — PLAN OF CARE
Problem: Renal Failure/Kidney Injury, Acute (Adult)  Goal: Signs and Symptoms of Listed Potential Problems Will be Absent, Minimized or Managed (Renal Failure/Kidney Injury, Acute)  Outcome: Ongoing (interventions implemented as appropriate)   12/15/18 8905   Goal/Outcome Evaluation   Problems Assessed (Acute Renal Failure/Kidney Injury) all   Problems Present (ARF/Kidney Injury) fluid imbalance

## 2018-12-15 NOTE — H&P
.    [facility]   HISTORY AND PHYSICAL      Name:  Toribio Dunaway   Age:  58 y.o.  Sex:  male  :  1960  MRN:  [id number]   Visit Number:  [id number]  Admission Date:  2018  Date Of Service:  18  Primary Care Physician:  Tomas Almodovar DO     Admitting diagnosis:      Community acquired pneumonia of left lower lobe of lung (CMS/HCC)    Acute kidney injury (CMS/HCC)    Dehydration    RA (rheumatoid arthritis) (CMS/HCC)    GERD (gastroesophageal reflux disease)        History Of Presenting Illness:    58-year-old male with past medical history of GERD, history of kidney stone, rheumatoid arthritis, comes into the ER because of her fever and anorexia cough with hemoptysis.  He states that since Monday he has not been eating much and has not much appetite and also has been coughing mucus with some blood.  He also has had some hematuria as per the patient and patient has a history of kidney stone and was seen last time in November in the ER.  He has not seen any primary physician.  In the ER he was further evaluated found to have left-sided infiltrate with white count 21,000 and acute kidney injury with dehydration.  Patient was started on IV fluids and IV Rocephin and Zithromax and has been further admitted for management of his pneumonia with acute kidney injury.  Patient states that he is very tired and sleepy and the week.  The besides that he does have arthritic pain and the states does not see the medical doctor regularly.  He says he has not been taking his medications regularly also.  Patient has been further admitted to [facility] for management of his above medical issues.  Also lactic acid was normal and there was no signs of sepsis and he is hemodynamically stable to be admitted on the floor.    Review Of Systems:     The following systems were reviewed and negative;  constitution, eyes, ENT, respiratory, cardiovascular, gastrointestinal, genitourinary,  musculoskeletal, neurological and behavioral/psych,  Skin except as above.     Past Medical History:    Past Medical History:   Diagnosis Date   • GERD (gastroesophageal reflux disease)    • Injury of back    • Kidney stone    • RA (rheumatoid arthritis) (CMS/[facility])        Past Surgical history:    Past Surgical History:   Procedure Laterality Date   • COLONOSCOPY     • LASIK     • TESTICLE SURGERY         Social History:    Social History     Socioeconomic History   • Marital status:      Spouse name: Not on file   • Number of children: Not on file   • Years of education: Not on file   • Highest education level: Not on file   Tobacco Use   • Smoking status: Never Smoker   • Smokeless tobacco: Never Used   Substance and Sexual Activity   • Alcohol use: No   • Drug use: No   • Sexual activity: Defer       Family History:    History reviewed. No pertinent family history.      Allergies:      Patient has no known allergies.    Home Medications:    Prior to Admission Medications     Prescriptions Last Dose Informant Patient Reported? Taking?    baclofen (LIORESAL) 10 MG tablet 12/14/2018  Yes Yes    Take 10 mg by mouth 2 (Two) Times a Day.    celecoxib (CeleBREX) 200 MG capsule 12/13/2018  Yes Yes    Take 200 mg by mouth Daily.    DULoxetine (CYMBALTA) 60 MG capsule 12/14/2018  Yes Yes    Take 60 mg by mouth 2 (Two) Times a Day.    gabapentin (NEURONTIN) 600 MG tablet 12/14/2018  Yes Yes    Take 600 mg by mouth 3 (Three) Times a Day.    ondansetron ODT (ZOFRAN-ODT) 4 MG disintegrating tablet 12/14/2018  No Yes    Take 1 tablet by mouth Every 8 (Eight) Hours As Needed for Nausea or Vomiting.    TRAMADOL HCL PO 12/14/2018  Yes Yes    Take 50 mg by mouth 3 (Three) Times a Day.                 Vital Signs:    Temp:  [98.3 °F (36.8 °C)-101.1 °F (38.4 °C)] 98.3 °F (36.8 °C)  Heart Rate:  [] 89  Resp:  [17-18] 18  BP: (106-128)/(55-68) 106/57        12/14/18  1650   Weight: 81.5 kg (179 lb 9.6 oz)       Body mass  index is 25.05 kg/m².    Physical Exam:      General Appearance:    Alert and cooperative,  And lying comfortably in bed   Head:    Atraumatic and normocephalic, without obvious abnormality.   Eyes:            PERRLA, conjunctivae and sclerae normal, no Icterus. No pallor. Extra-occular movements are within normal limits.   Ears:    Ears appear intact with no abnormalities noted.   Throat:   No oral lesions, no thrush, oral mucosa moist.   Neck:   Supple, trachea midline, no thyromegaly, no carotid bruit, no lymphadenopathy   Lungs:     Chest shape is normal. Breath sounds heard bilaterally equally.  No crackles or wheezing. No Pleural rub or bronchial breathing.      Heart:    Normal S1 and S2, no murmur, no gallop, no rub. No JVD   Abdomen:     Normal bowel sounds, no masses, no organomegaly. Soft        non-tender, non-distended, no guarding, no rebound                tenderness   Extremities:   Moves all extremities well, no edema, no cyanosis, no             clubbing   Skin:   No  bruising or rash   Neurologic:   Cranial nerves 2 - 12 grossly intact, sensation intact, Motor power is normal and equal bilaterally.       EKG:      EKG has not been done    Labs:    Lab Results (last 24 hours)     Procedure Component Value Units Date/Time    Lactic Acid, Plasma [[id number]]  (Normal) Collected:  12/14/18 1832    Specimen:  Blood Updated:  12/14/18 1852     Lactate 0.7 mmol/L     Blood Culture - Blood, Hand, Right [[id number]] Collected:  12/14/18 1834    Specimen:  Blood from Hand, Right Updated:  12/14/18 1848    Blood Culture - Blood, Hand, Left [[id number]] Collected:  12/14/18 1832    Specimen:  Blood from Hand, Left Updated:  12/14/18 1840    Comprehensive Metabolic Panel [[id number]]  (Abnormal) Collected:  12/14/18 1737    Specimen:  Blood Updated:  12/14/18 1757     Glucose 115 mg/dL      BUN 46 mg/dL      Creatinine 3.20 mg/dL      Sodium 138 mmol/L      Potassium 4.1 mmol/L      Chloride 99 mmol/L      CO2  22.0 mmol/L      Calcium 9.9 mg/dL      Total Protein 8.1 g/dL      Albumin 4.50 g/dL      ALT (SGPT) 38 U/L      AST (SGOT) 46 U/L      Alkaline Phosphatase 123 U/L      Total Bilirubin 1.3 mg/dL      eGFR Non African Amer 20 mL/min/1.73      Globulin 3.6 gm/dL      A/G Ratio 1.3 g/dL      BUN/Creatinine Ratio 14.4     Anion Gap 21.1 mmol/L     Narrative:       GFR Normal >60  Chronic Kidney Disease <60  Kidney Failure <15    Influenza Antigen, Rapid - Swab, Nasopharynx [[id number]]  (Normal) Collected:  12/14/18 1737    Specimen:  Swab from Nasopharynx Updated:  12/14/18 1754     Influenza A Ag, EIA Negative     Influenza B Ag, EIA Negative    CBC & Differential [[id number]] Collected:  12/14/18 1737    Specimen:  Blood Updated:  12/14/18 1745    Narrative:       The following orders were created for panel order CBC & Differential.  Procedure                               Abnormality         Status                     ---------                               -----------         ------                     CBC Auto Differential[[id number]]        Abnormal            Final result                 Please view results for these tests on the individual orders.    CBC Auto Differential [[id number]]  (Abnormal) Collected:  12/14/18 1737    Specimen:  Blood Updated:  12/14/18 1745     WBC 21.01 10*3/mm3      RBC 3.72 10*6/mm3      Hemoglobin 12.0 g/dL      Hematocrit 35.0 %      MCV 94.1 fL      MCH 32.3 pg      MCHC 34.3 g/dL      RDW 12.2 %      RDW-SD 42.5 fl      MPV 8.5 fL      Platelets 343 10*3/mm3      Neutrophil % 87.3 %      Lymphocyte % 3.5 %      Monocyte % 6.9 %      Eosinophil % 0.0 %      Basophil % 0.3 %      Immature Grans % 2.0 %      Neutrophils, Absolute 18.32 10*3/mm3      Lymphocytes, Absolute 0.74 10*3/mm3      Monocytes, Absolute 1.46 10*3/mm3      Eosinophils, Absolute 0.01 10*3/mm3      Basophils, Absolute 0.06 10*3/mm3      Immature Grans, Absolute 0.42 10*3/mm3      nRBC 0.0 /100 WBC            Radiology:    Imaging Results (last 72 hours)     Procedure Component Value Units Date/Time    XR Chest 2 View [[id number]] Collected:  12/14/18 1738     Updated:  12/14/18 1739    Narrative:       FINAL REPORT    CLINICAL HISTORY:  soa, cough    COMPARISON:  None.    FINDINGS:  There is a hazy left lung and in frontal view.  Lateral view  demonstrates increased density  across the lower  thoracic spine  and findings are highly suggestive of a developing  consolidation in the posterior left lung base. No effusion or  pneumothorax. Heart size is normal. No acute bony abnormality.      Impression:       Findings suggestive of a developing consolidation in the  posterior left lung base  that could represent a pneumonia given  the history.  Appropriate treatment and followup x-rays in 6  weeks is recommended to ensure proper resolution.    Authenticated by Madhu Reddy MD on 12/14/2018 05:38:45 PM          Assessment:    Assessment/Plan       Community acquired pneumonia of left lower lobe of lung (CMS/HCC)    Acute kidney injury (CMS/HCC)    Dehydration    RA (rheumatoid arthritis) (CMS/HCC)    GERD (gastroesophageal reflux disease)        Plan:     1.  Community-acquired left lower lobe pneumonia we'll admit and start with Rocephin and Zithromax IV fluids and hydration along with bronchodilators.  Also mucolytic agents to be given and the room air saturation is 93% and he is will be encouraged to use the incentive spirometer   2.  Acute kidney injury his creatinine now is in the 3 range and will his baseline creatinine was 1.3 a month ago.  He seems to be clinically dehydrated and will hydrate overnight and follow-up  3.  Dehydration clinically seems to be dehydrated and has not been eating drinking much.  This seems to be because of possible pneumonia and infection and will follow up after hydration  4.  History of hematuria-he does have a history of kidney stone and the will hydrate and the follow-up and  repeat the UA.  If hematuria persists to we'll possibly consult urology.  The CT scan done a month ago did show he had a 2 millimeter ureteral stone.    Dat Grier MD  12/14/18  9:17 PM    Please note that portions of this note may have been completed with a voice recognition program. Efforts were made to edit the dictations, but occasionally words are mistranscribed.

## 2018-12-15 NOTE — PROGRESS NOTES
"[facility]    Name: Toribio Dunaway, 58 y.o. male  MRN: [id number], : 1960   Date of Admission: 2018   Date of Service: 12/15/18   PCP: Tomas Almodovar DO     Hospital Course:   Mr. Dunaway is a 59 yo M with h/o GERD, RA, renal stones who was admitted on 18 for community acquired pneumonia (fever, cough with blood tinged mucus, poor appetite x 6 days). Vitals on admission notable for , O2 sat appropriate on RA. Labs on admission notable for WBC 21K with left shift; hgb 12; BUN/Cr 46/3.2; Glc 115; Influenza A/B: negative; BCx x 2 obtained.  Radiographs on admission notable for CXR: \"Findings suggestive of a developing consolidation in the posterior left lung base that could represent a pneumonia given the history.  Appropriate treatment and followup x-rays in 6 weeks is recommended to ensure proper resolution.\". Patient was given rocephin, azithromycin, baclofen, duoneb, NS 3L bolus, tylenol in ER.    Consultants: none   Procedures: none    Antibiotics:   Azithromycin d2  Rocephin d2   Micro:    BCx: ngtd x 2   Influenza A/B: negative    -----------------------------------------------------------------------------------------------------------------------------------------------------------------------------------------------------------------------------------------------------  Subjective   Chief Complaint: f/u pneumonia     Subjective:   Patient seen and examined this morning. Events from last night noted. Discussed with MIRIAN Downs. Feeling a little better this morning. No shortness of breath. Not much of appetite. Discussed renal function mildly improved.     Review of Systems:   Gen-no fevers, no chills  CV-no chest pain, no palpitations  GI-no N/V/D, no abd pain    Objective   Objective:     Intake/Output Summary (Last 24 hours) at 12/15/2018 0719  Last data filed at 2018 2248  Gross per 24 hour   Intake 1000 ml   Output 100 " ml   Net 900 ml      SpO2: 94 %     Physical Examination:   Vitals:    12/15/18 0300 12/15/18 0500 12/15/18 0701 12/15/18 0704   BP: 107/59      BP Location: Left arm      Patient Position: Lying      Pulse: 94  94 95   Resp: 20  18 20   Temp: 97.9 °F (36.6 °C)      TempSrc: Oral      SpO2:   94%    Weight:  83.7 kg (184 lb 8 oz)     Height:          General:  Pleasant, elderly male, no acute distress; on supplement O2 via NC; dry mucous membranes  Heart:   RRR, S1 S2 normal, no m/r/g  Lungs:   Rhonchi bilaterally at lower lobes, no wheezes  Abdomen:  soft, NT, ND, +BS  Extremities: no edema/cyanosis/clubbing  Neuro:  A&Ox3, no focal deficits  Psych:  appropriate mood  Skin:  No bleeding, bruising, or rash    Pertinent laboratory and radiology data were reviewed:  Microbiology Results (last 10 days)     Procedure Component Value - Date/Time    Blood Culture - Blood, Hand, Right [[id number]] Collected:  12/14/18 1834    Lab Status:  Preliminary result Specimen:  Blood from Hand, Right Updated:  12/15/18 0701     Blood Culture No growth at less than 24 hours    Blood Culture - Blood, Hand, Left [[id number]] Collected:  12/14/18 1832    Lab Status:  Preliminary result Specimen:  Blood from Hand, Left Updated:  12/15/18 0646     Blood Culture No growth at less than 24 hours    Influenza Antigen, Rapid - Swab, Nasopharynx [[id number]]  (Normal) Collected:  12/14/18 1737    Lab Status:  Final result Specimen:  Swab from Nasopharynx Updated:  12/14/18 1754     Influenza A Ag, EIA Negative     Influenza B Ag, EIA Negative          Medications Reviewed:     ceftriaxone 1 g Intravenous Q24H   And      azithromycin 500 mg Intravenous Q24H   baclofen 10 mg Oral BID   DULoxetine 60 mg Oral Daily   ipratropium-albuterol 3 mL Nebulization 4x Daily - RT        Assessment /Plan   Assessment/Plan:   1. Community acquired left lower lobe pneumonia.   · Azithromycin, rocephin  · Leukocytosis improved.  · Feels better today  · Nebs  · F/u  BCx    2. Acute renal failure due to dehydration/poor PO intake  · Creatinine improved. Continue IVF.   · Repeat labs in AM  · Encouraged PO intake as well    3. H/o hematuria. Repeat UA    FEN: cardiac  DVT Prophylaxis: SCD  PUD Prophylaxis: not indicated    Reason for continued hospitalization: above  Disposition: pending clinical improvement  Discussed with: patient and RN Yareli Shin MD   7:19 AM on 12/15/2018

## 2018-12-15 NOTE — PAYOR COMM NOTE
"Please review for inpatient auth  Alyce ADDISON [phone], fax:  [phone]  Tax ID:    NPI:  [id number]    DR Grier NPI [id number]    ICD 10 codes:  N17.9, J18.1    Toribio Webb (58 y.o. Male)     Date of Birth Social Security Number Address Home Phone MRN    1960  [address] [place] [address] [phone] [id number]    [employer] Marital Status          Sikh        Admission Date Admission Type Admitting Provider Attending Provider Department, Room/Bed    18 Emergency Dat Grier MD Manivannan, Suganya, MD [facility] MED SURG  3, 319/    Discharge Date Discharge Disposition Discharge Destination                       Attending Provider:  Ada Shin MD    Allergies:  No Known Allergies    Isolation:  None   Infection:  None   Code Status:  CPR    Ht:  180.3 cm (71\")   Wt:  83.7 kg (184 lb 8 oz)    Admission Cmt:  None   Principal Problem:  Community acquired pneumonia of left lower lobe of lung (CMS/HCC) [J18.1]                 Active Insurance as of 2018     Primary Coverage     Payor Plan Insurance Group Employer/Plan Group    CIG [id number]     Payor Plan Address Payor Plan Phone Number Payor Plan Fax Number Effective Dates    [address]   2012 - None Entered    [facility] [id number]       Subscriber Name Subscriber Birth Date Member ID       BUFFY WEBB 1964 [id number]                 Emergency Contacts      (Rel.) Home Phone Work Phone Mobile Phone    Buffy Webb (Spouse) [phone] -- --               History & Physical      Dat Grier MD at 2018  9:17 PM          .    [facility],  [facility] MEDICINE   HISTORY AND PHYSICAL      Name:  Toribio Webb   Age:  58 y.o.  Sex:  male  :  1960  MRN:  [id number]   Visit Number:  [id number]  Admission Date:  2018  Date Of Service:  18  Primary Care Physician:  Tomas Almodovar, DO     Admitting " diagnosis:      Community acquired pneumonia of left lower lobe of lung (CMS/HCC)    Acute kidney injury (CMS/HCC)    Dehydration    RA (rheumatoid arthritis) (CMS/HCC)    GERD (gastroesophageal reflux disease)        History Of Presenting Illness:    58-year-old male with past medical history of GERD, history of kidney stone, rheumatoid arthritis, comes into the ER because of her fever and anorexia cough with hemoptysis.  He states that since Monday he has not been eating much and has not much appetite and also has been coughing mucus with some blood.  He also has had some hematuria as per the patient and patient has a history of kidney stone and was seen last time in November in the ER.  He has not seen any primary physician.  In the ER he was further evaluated found to have left-sided infiltrate with white count 21,000 and acute kidney injury with dehydration.  Patient was started on IV fluids and IV Rocephin and Zithromax and has been further admitted for management of his pneumonia with acute kidney injury.  Patient states that he is very tired and sleepy and the week.  The besides that he does have arthritic pain and the states does not see the medical doctor regularly.  He says he has not been taking his medications regularly also.  Patient has been further admitted to [facility] for management of his above medical issues.  Also lactic acid was normal and there was no signs of sepsis and he is hemodynamically stable to be admitted on the floor.    Review Of Systems:     The following systems were reviewed and negative;  constitution, eyes, ENT, respiratory, cardiovascular, gastrointestinal, genitourinary, musculoskeletal, neurological and behavioral/psych,  Skin except as above.     Past Medical History:    Past Medical History:   Diagnosis Date   • GERD (gastroesophageal reflux disease)    • Injury of back    • Kidney stone    • RA (rheumatoid arthritis) (CMS/HCC)        Past Surgical history:    Past Surgical  History:   Procedure Laterality Date   • COLONOSCOPY     • LASIK     • TESTICLE SURGERY         Social History:    Social History     Socioeconomic History   • Marital status:      Spouse name: Not on file   • Number of children: Not on file   • Years of education: Not on file   • Highest education level: Not on file   Tobacco Use   • Smoking status: Never Smoker   • Smokeless tobacco: Never Used   Substance and Sexual Activity   • Alcohol use: No   • Drug use: No   • Sexual activity: Defer       Family History:    History reviewed. No pertinent family history.      Allergies:      Patient has no known allergies.    Home Medications:    Prior to Admission Medications     Prescriptions Last Dose Informant Patient Reported? Taking?    baclofen (LIORESAL) 10 MG tablet 12/14/2018  Yes Yes    Take 10 mg by mouth 2 (Two) Times a Day.    celecoxib (CeleBREX) 200 MG capsule 12/13/2018  Yes Yes    Take 200 mg by mouth Daily.    DULoxetine (CYMBALTA) 60 MG capsule 12/14/2018  Yes Yes    Take 60 mg by mouth 2 (Two) Times a Day.    gabapentin (NEURONTIN) 600 MG tablet 12/14/2018  Yes Yes    Take 600 mg by mouth 3 (Three) Times a Day.    ondansetron ODT (ZOFRAN-ODT) 4 MG disintegrating tablet 12/14/2018  No Yes    Take 1 tablet by mouth Every 8 (Eight) Hours As Needed for Nausea or Vomiting.    TRAMADOL HCL PO 12/14/2018  Yes Yes    Take 50 mg by mouth 3 (Three) Times a Day.                 Vital Signs:    Temp:  [98.3 °F (36.8 °C)-101.1 °F (38.4 °C)] 98.3 °F (36.8 °C)  Heart Rate:  [] 89  Resp:  [17-18] 18  BP: (106-128)/(55-68) 106/57        12/14/18  1650   Weight: 81.5 kg (179 lb 9.6 oz)       Body mass index is 25.05 kg/m².    Physical Exam:      General Appearance:    Alert and cooperative,  And lying comfortably in bed   Head:    Atraumatic and normocephalic, without obvious abnormality.   Eyes:            PERRLA, conjunctivae and sclerae normal, no Icterus. No pallor. Extra-occular movements are within  normal limits.   Ears:    Ears appear intact with no abnormalities noted.   Throat:   No oral lesions, no thrush, oral mucosa moist.   Neck:   Supple, trachea midline, no thyromegaly, no carotid bruit, no lymphadenopathy   Lungs:     Chest shape is normal. Breath sounds heard bilaterally equally.  No crackles or wheezing. No Pleural rub or bronchial breathing.      Heart:    Normal S1 and S2, no murmur, no gallop, no rub. No JVD   Abdomen:     Normal bowel sounds, no masses, no organomegaly. Soft        non-tender, non-distended, no guarding, no rebound                tenderness   Extremities:   Moves all extremities well, no edema, no cyanosis, no             clubbing   Skin:   No  bruising or rash   Neurologic:   Cranial nerves 2 - 12 grossly intact, sensation intact, Motor power is normal and equal bilaterally.       EKG:      EKG has not been done    Labs:    Lab Results (last 24 hours)     Procedure Component Value Units Date/Time    Lactic Acid, Plasma [[id number]]  (Normal) Collected:  12/14/18 1832    Specimen:  Blood Updated:  12/14/18 1852     Lactate 0.7 mmol/L     Blood Culture - Blood, Hand, Right [[id number]] Collected:  12/14/18 1834    Specimen:  Blood from Hand, Right Updated:  12/14/18 1848    Blood Culture - Blood, Hand, Left [[id number]] Collected:  12/14/18 1832    Specimen:  Blood from Hand, Left Updated:  12/14/18 1840    Comprehensive Metabolic Panel [[id number]]  (Abnormal) Collected:  12/14/18 1737    Specimen:  Blood Updated:  12/14/18 1757     Glucose 115 mg/dL      BUN 46 mg/dL      Creatinine 3.20 mg/dL      Sodium 138 mmol/L      Potassium 4.1 mmol/L      Chloride 99 mmol/L      CO2 22.0 mmol/L      Calcium 9.9 mg/dL      Total Protein 8.1 g/dL      Albumin 4.50 g/dL      ALT (SGPT) 38 U/L      AST (SGOT) 46 U/L      Alkaline Phosphatase 123 U/L      Total Bilirubin 1.3 mg/dL      eGFR Non African Amer 20 mL/min/1.73      Globulin 3.6 gm/dL      A/G Ratio 1.3 g/dL      BUN/Creatinine Ratio  14.4     Anion Gap 21.1 mmol/L     Narrative:       GFR Normal >60  Chronic Kidney Disease <60  Kidney Failure <15    Influenza Antigen, Rapid - Swab, Nasopharynx [[id number]]  (Normal) Collected:  12/14/18 1737    Specimen:  Swab from Nasopharynx Updated:  12/14/18 1754     Influenza A Ag, EIA Negative     Influenza B Ag, EIA Negative    CBC & Differential [[id number]] Collected:  12/14/18 1737    Specimen:  Blood Updated:  12/14/18 1745    Narrative:       The following orders were created for panel order CBC & Differential.  Procedure                               Abnormality         Status                     ---------                               -----------         ------                     CBC Auto Differential[[id number]]        Abnormal            Final result                 Please view results for these tests on the individual orders.    CBC Auto Differential [[id number]]  (Abnormal) Collected:  12/14/18 1737    Specimen:  Blood Updated:  12/14/18 1745     WBC 21.01 10*3/mm3      RBC 3.72 10*6/mm3      Hemoglobin 12.0 g/dL      Hematocrit 35.0 %      MCV 94.1 fL      MCH 32.3 pg      MCHC 34.3 g/dL      RDW 12.2 %      RDW-SD 42.5 fl      MPV 8.5 fL      Platelets 343 10*3/mm3      Neutrophil % 87.3 %      Lymphocyte % 3.5 %      Monocyte % 6.9 %      Eosinophil % 0.0 %      Basophil % 0.3 %      Immature Grans % 2.0 %      Neutrophils, Absolute 18.32 10*3/mm3      Lymphocytes, Absolute 0.74 10*3/mm3      Monocytes, Absolute 1.46 10*3/mm3      Eosinophils, Absolute 0.01 10*3/mm3      Basophils, Absolute 0.06 10*3/mm3      Immature Grans, Absolute 0.42 10*3/mm3      nRBC 0.0 /100 WBC           Radiology:    Imaging Results (last 72 hours)     Procedure Component Value Units Date/Time    XR Chest 2 View [[id number]] Collected:  12/14/18 1738     Updated:  12/14/18 1739    Narrative:       FINAL REPORT    CLINICAL HISTORY:  soa, cough    COMPARISON:  None.    FINDINGS:  There is a hazy left lung and in frontal  view.  Lateral view  demonstrates increased density  across the lower  thoracic spine  and findings are highly suggestive of a developing  consolidation in the posterior left lung base. No effusion or  pneumothorax. Heart size is normal. No acute bony abnormality.      Impression:       Findings suggestive of a developing consolidation in the  posterior left lung base  that could represent a pneumonia given  the history.  Appropriate treatment and followup x-rays in 6  weeks is recommended to ensure proper resolution.    Authenticated by Madhu Reddy MD on 12/14/2018 05:38:45 PM          Assessment:    Assessment/Plan       Community acquired pneumonia of left lower lobe of lung (CMS/HCC)    Acute kidney injury (CMS/HCC)    Dehydration    RA (rheumatoid arthritis) (CMS/HCC)    GERD (gastroesophageal reflux disease)        Plan:     1.  Community-acquired left lower lobe pneumonia we'll admit and start with Rocephin and Zithromax IV fluids and hydration along with bronchodilators.  Also mucolytic agents to be given and the room air saturation is 93% and he is will be encouraged to use the incentive spirometer   2.  Acute kidney injury his creatinine now is in the 3 range and will his baseline creatinine was 1.3 a month ago.  He seems to be clinically dehydrated and will hydrate overnight and follow-up  3.  Dehydration clinically seems to be dehydrated and has not been eating drinking much.  This seems to be because of possible pneumonia and infection and will follow up after hydration  4.  History of hematuria-he does have a history of kidney stone and the will hydrate and the follow-up and repeat the UA.  If hematuria persists to we'll possibly consult urology.  The CT scan done a month ago did show he had a 2 millimeter ureteral stone.    Dat Grier MD  12/14/18  9:17 PM    Please note that portions of this note may have been completed with a voice recognition program. Efforts were made to edit the  "dictations, but occasionally words are mistranscribed.    Electronically signed by Dat Grier MD at 12/14/2018  9:39 PM          Emergency Department Notes      Amy Thurston APRN at 12/14/2018  5:25 PM          Subjective   58-year-old male patient sent the emergency room with a two-week history of productive cough, low-grade fever, decreased by mouth intake, generalized body aches.  She denies any nausea or vomiting.  Office productive of thick yellow sometimes brown sputum.  Occasionally streaked with hemoptysis.  No sick contacts that he is aware of.  He is unsure as to how high his fevers as he has not yet taken it.  He just \"knows have had one\".  He denies cardiac chest pain.  Does complain of some pleuritic pain with deep inspiration.  No Palpitations.            Review of Systems  A 10 point review of systems including constitutional, ENT, cardiovascular, respiratory, GI, , musculoskeletal, neuro, skin, psychiatric was performed and it was negative with exception lies body aches, productive cough, low-grade fever decreased intake   Past Medical History:   Diagnosis Date   • GERD (gastroesophageal reflux disease)    • Injury of back    • Kidney stone    • RA (rheumatoid arthritis) (CMS/[facility])        No Known Allergies    Past Surgical History:   Procedure Laterality Date   • COLONOSCOPY     • LASIK     • TESTICLE SURGERY         History reviewed. No pertinent family history.    Social History     Socioeconomic History   • Marital status:      Spouse name: Not on file   • Number of children: Not on file   • Years of education: Not on file   • Highest education level: Not on file   Tobacco Use   • Smoking status: Never Smoker   • Smokeless tobacco: Never Used   Substance and Sexual Activity   • Alcohol use: No   • Drug use: No           Objective   Physical Exam  Constitutional: The patient is cooperative. Appears well-developed and well-nourished.  Ill-appearing but nontoxic  Psychological: " Alert and oriented x3. No abnormalities of mood affect.  Eyes: Pupils are equal round reactive to light.  Conjunctiva are within normal limits.  ENT: Oropharynx is clear.  Neck: The neck is supple.  No crepitus.  Full range of motion without pain.  Chest: There is no tenderness to the chest wall.  Respiratory: Respiratory effort was normal.  There is no rales, wheezes, or rhonchi.  There is no stridor.  Air entry is equal.  Cardiovascular: Regular rate and rhythm, no murmurs, gallops, rubs.  Capillary refill is brisk.  Gastrointestinal: Abdomen soft, nontender, nondistended.  There is no rebound tenderness or guarding.  No organomegaly is noted.  Genitourinary: Not examined  Lymphatic: No gross lymphadenopathy noted.  Musculoskeletal: Musculoskeletal system is grossly intact.  There is no obvious deformity.  Neurological: [place] Coma Scale is 15.  Gross motor movement is intact in all 4 extremities.  Patient exhibits normal speech.  Skin: Skin is normal to inspection and palpation.  Warm and dry.  No obvious bruising.  No obvious rash.    Procedures  Lab Results (last 24 hours)     Procedure Component Value Units Date/Time    Comprehensive Metabolic Panel [[id number]]  (Abnormal) Collected:  12/14/18 1737    Specimen:  Blood Updated:  12/14/18 1757     Glucose 115 mg/dL      BUN 46 mg/dL      Creatinine 3.20 mg/dL      Sodium 138 mmol/L      Potassium 4.1 mmol/L      Chloride 99 mmol/L      CO2 22.0 mmol/L      Calcium 9.9 mg/dL      Total Protein 8.1 g/dL      Albumin 4.50 g/dL      ALT (SGPT) 38 U/L      AST (SGOT) 46 U/L      Alkaline Phosphatase 123 U/L      Total Bilirubin 1.3 mg/dL      eGFR Non African Amer 20 mL/min/1.73      Globulin 3.6 gm/dL      A/G Ratio 1.3 g/dL      BUN/Creatinine Ratio 14.4     Anion Gap 21.1 mmol/L     Narrative:       GFR Normal >60  Chronic Kidney Disease <60  Kidney Failure <15    Influenza Antigen, Rapid - Swab, Nasopharynx [[id number]]  (Normal) Collected:  12/14/18 1737     Specimen:  Swab from Nasopharynx Updated:  12/14/18 1754     Influenza A Ag, EIA Negative     Influenza B Ag, EIA Negative    CBC & Differential [[id number]] Collected:  12/14/18 1737    Specimen:  Blood Updated:  12/14/18 1745    Narrative:       The following orders were created for panel order CBC & Differential.  Procedure                               Abnormality         Status                     ---------                               -----------         ------                     CBC Auto Differential[[id number]]        Abnormal            Final result                 Please view results for these tests on the individual orders.    CBC Auto Differential [[id number]]  (Abnormal) Collected:  12/14/18 1737    Specimen:  Blood Updated:  12/14/18 1745     WBC 21.01 10*3/mm3      RBC 3.72 10*6/mm3      Hemoglobin 12.0 g/dL      Hematocrit 35.0 %      MCV 94.1 fL      MCH 32.3 pg      MCHC 34.3 g/dL      RDW 12.2 %      RDW-SD 42.5 fl      MPV 8.5 fL      Platelets 343 10*3/mm3      Neutrophil % 87.3 %      Lymphocyte % 3.5 %      Monocyte % 6.9 %      Eosinophil % 0.0 %      Basophil % 0.3 %      Immature Grans % 2.0 %      Neutrophils, Absolute 18.32 10*3/mm3      Lymphocytes, Absolute 0.74 10*3/mm3      Monocytes, Absolute 1.46 10*3/mm3      Eosinophils, Absolute 0.01 10*3/mm3      Basophils, Absolute 0.06 10*3/mm3      Immature Grans, Absolute 0.42 10*3/mm3      nRBC 0.0 /100 WBC           Imaging Results (last 24 hours)     Procedure Component Value Units Date/Time    XR Chest 2 View [[id number]] Collected:  12/14/18 1738     Updated:  12/14/18 1739    Narrative:       FINAL REPORT    CLINICAL HISTORY:  soa, cough    COMPARISON:  None.    FINDINGS:  There is a hazy left lung and in frontal view.  Lateral view  demonstrates increased density  across the lower  thoracic spine  and findings are highly suggestive of a developing  consolidation in the posterior left lung base. No effusion or  pneumothorax. Heart size  is normal. No acute bony abnormality.      Impression:       Findings suggestive of a developing consolidation in the  posterior left lung base  that could represent a pneumonia given  the history.  Appropriate treatment and followup x-rays in 6  weeks is recommended to ensure proper resolution.    Authenticated by Madhu Reddy MD on 12/14/2018 05:38:45 PM              ED Course  ED Course as of Dec 14 1821   Fri Dec 14, 2018   1759 Anion Gap: (!) 21.1 [EB]      ED Course User Index  [EB] Amy Thurston APRN      Patient's laboratory studies significant for elevated BUN/creatinine indicating acute kidney injury, wbc's.  Chest x-ray revealed left lung base consolidation consistent with pneumonia.  Patient scores a 2 on a curb 65 pneumonia severity scoring right ear area, placing him in a moderate severity category with a risk of death being around 9%.  This information and felt to be prudent patient be admitted for IV antibiotics and further evaluation and treatment.  Discussed case with the hospitalist (Dr. Grier) was agreed to accept the patient for admission.  Plan care discussed with the patient who verbalized understanding was in agreement.  The admission as been arranged.  Antibiotics will be initiated once serum lactate and blood cultures have been obtained.            MDM      Final diagnoses:   Community acquired pneumonia of left lower lobe of lung (CMS/HCC)            Amy Thurston APRN  12/14/18 1821      Electronically signed by Amy Thurston APRN at 12/14/2018  6:21 PM     Eliz Estes at 12/14/2018  6:16 PM        Paged Dr. Grier for CHUYITA Reyes, @ [phone].     Eliz Estes  12/14/18 1816      Electronically signed by Eliz Estes at 12/14/2018  6:16 PM     Eliz Estes at 12/14/2018  6:25 PM        Requested Med-Surg bed from padmini Richards, @ 1822. Will call back w/ bed assignment.      Eliz Estes  12/14/18 1825      Electronically signed by Eliz Estes at 12/14/2018  6:25 PM    "  Eliz Estes at 12/14/2018  6:38 PM        Naresh Richards, assigned Room 319 @ 1839. MIRIAN Metz, notified.      Eliz Estes  12/14/18 1839      Electronically signed by Eliz Estes at 12/14/2018  6:39 PM       ICU Vital Signs     Row Name 12/15/18 0751 12/15/18 0704 12/15/18 0701 12/15/18 0500 12/15/18 0400       Height and Weight    Weight  --  --  --  83.7 kg (184 lb 8 oz)  --    Weight Method  --  --  --  Bed scale  --       Vitals    Temp  98 °F (36.7 °C)  --  --  --  --    Temp src  Oral  --  --  --  --    Pulse  101  95  94  --  --    Heart Rate Source  Monitor  Monitor  Monitor  --  --    Resp  18  20  18  --  --    Resp Rate Source  Visual  Visual  Visual  --  --    BP  126/58  --  --  --  --    BP Location  Left arm  --  --  --  --    BP Method  Automatic  --  --  --  --    Patient Position  Lying  --  --  --  --       Oxygen Therapy    SpO2  95 %  --  94 %  --  --    Pulse Oximetry Type  --  --  Continuous  --  --    Device (Oxygen Therapy)  nasal cannula  --  nasal cannula  --  room air    Flow (L/min)  --  --  2  --  --    Row Name 12/15/18 0300 12/15/18 0000 12/14/18 2300 12/14/18 2248 12/14/18 2241       Vitals    Temp  97.9 °F (36.6 °C)  --  97.5 °F (36.4 °C)  --  --    Temp src  Oral  --  Oral  --  --    Pulse  94  --  103  89  95    Heart Rate Source  Monitor  --  Monitor  --  --    Resp  20  --  18  18  20    Resp Rate Source  Visual  --  Visual  --  --    BP  107/59  --  110/62  --  --    BP Location  Left arm  --  Left arm  --  --    BP Method  Automatic  --  Automatic  --  --    Patient Position  Lying  --  Lying  --  --       Oxygen Therapy    SpO2  --  --  --  --  92 %    Pulse Oximetry Type  Continuous  --  Continuous  --  --    Device (Oxygen Therapy)  room air  room air  room air  --  room air    Row Name 12/14/18 2000 12/14/18 19:03:07 12/14/18 1650 12/14/18 1532          Height and Weight    Height  --  180.3 cm (71\")  180.3 cm (71\")  180.3 cm (71\")     Height Method  --  Stated " " Stated  Actual     Weight  --  83.2 kg (183 lb 8 oz)  81.5 kg (179 lb 9.6 oz)  81.2 kg (179 lb)     Weight Method  --  Bed scale  Standing scale  Standing scale     Ideal Body Weight (IBW) (kg)  --  79.27  79.27  79.27     BSA (Calculated - sq m)  --  2.03 sq meters  2.01 sq meters  2.01 sq meters     BMI (Calculated)  --  25.6  25.1  25     Weight in (lb) to have BMI = 25  --  178.9  178.9  178.9        Vitals    Temp  --  97.4 °F (36.3 °C)  98.3 °F (36.8 °C)  101.1 °F (38.4 °C)  (Abnormal)      Temp src  --  Oral  Oral  Oral     Pulse  --  89  128  (Abnormal)   136  (Abnormal)      Heart Rate Source  --  Monitor  Monitor  Monitor     Resp  --  18  17  --     Resp Rate Source  --  Visual  Visual  --     BP  --  106/57  128/55  116/68     Noninvasive MAP (mmHg)  --  70  --  --     BP Location  --  Left arm  Left arm  Left arm     BP Method  --  Automatic  Automatic  Manual     Patient Position  --  Lying  Sitting  Sitting        Oxygen Therapy    SpO2  --  94 %  94 %  95 %     Pulse Oximetry Type  --  Continuous  Continuous  Intermittent     Device (Oxygen Therapy)  room air  room air  room air  --         Hospital Medications (active)       Dose Frequency Start End    acetaminophen (TYLENOL) tablet 1,000 mg 1,000 mg Once 12/14/2018 12/14/2018    Sig - Route: Take 2 tablets by mouth 1 (One) Time. - Oral    AZITHROMYCIN 500 MG/250 ML 0.9% NS IVPB (vial-mate) 500 mg Once 12/14/2018 12/14/2018    Sig - Route: Infuse 500 mg into a venous catheter 1 (One) Time. - Intravenous    Cosign for Ordering: Required by Fidencio Culver DO    AZITHROMYCIN 500 MG/250 ML 0.9% NS IVPB (vial-mate) 500 mg Every 24 Hours 12/15/2018 12/18/2018    Sig - Route: Infuse 500 mg into a venous catheter Daily. - Intravenous    Linked Group 1:  \"And\" Linked Group Details        baclofen (LIORESAL) tablet 10 mg 10 mg 2 Times Daily 12/14/2018     Sig - Route: Take 1 tablet by mouth 2 (Two) Times a Day. - Oral    cefTRIAXone (ROCEPHIN) IVPB 1 " "g/50ml dextrose (premix) 1 g Once 12/14/2018 12/14/2018    Sig - Route: Infuse 50 mL into a venous catheter 1 (One) Time. - Intravenous    Cosign for Ordering: Required by Fidencio Culver DO    cefTRIAXone (ROCEPHIN) IVPB 1 g/50ml dextrose (premix) 1 g Every 24 Hours 12/15/2018 12/18/2018    Sig - Route: Infuse 50 mL into a venous catheter Daily. - Intravenous    Linked Group 1:  \"And\" Linked Group Details        DULoxetine (CYMBALTA) DR capsule 60 mg 60 mg Daily 12/15/2018     Sig - Route: Take 2 capsules by mouth Daily. - Oral    ipratropium-albuterol (DUO-NEB) nebulizer solution 3 mL 3 mL 4 Times Daily - RT 12/14/2018     Sig - Route: Take 3 mL by nebulization 4 (Four) Times a Day. - Nebulization    ondansetron ODT (ZOFRAN-ODT) disintegrating tablet 4 mg 4 mg Every 8 Hours PRN 12/14/2018     Sig - Route: Take 1 tablet by mouth Every 8 (Eight) Hours As Needed for Nausea or Vomiting. - Oral    sodium chloride 0.9 % bolus 1,000 mL 1,000 mL Once 12/14/2018 12/14/2018    Sig - Route: Infuse 1,000 mL into a venous catheter 1 (One) Time. - Intravenous    Cosign for Ordering: Accepted by Fidencio Culver DO on 12/14/2018  5:37 PM    sodium chloride 0.9 % bolus 2,445 mL 30 mL/kg × 81.5 kg Once 12/14/2018 12/14/2018    Sig - Route: Infuse 2,445 mL into a venous catheter 1 (One) Time. - Intravenous    Cosign for Ordering: Required by Fidencio Culver DO    sodium chloride 0.9 % infusion 125 mL/hr Continuous 12/14/2018     Sig - Route: Infuse 125 mL/hr into a venous catheter Continuous. - Intravenous            Lab Results (last 24 hours)     Procedure Component Value Units Date/Time    Blood Culture - Blood, Hand, Right [[id number]] Collected:  12/14/18 1834    Specimen:  Blood from Hand, Right Updated:  12/15/18 0701     Blood Culture No growth at less than 24 hours    Blood Culture - Blood, Hand, Left [[id number]] Collected:  12/14/18 1832    Specimen:  Blood from Hand, Left Updated:  12/15/18 0646     Blood Culture No " growth at less than 24 hours    Comprehensive Metabolic Panel [[id number]]  (Abnormal) Collected:  12/15/18 0545    Specimen:  Blood Updated:  12/15/18 0633     Glucose 102 mg/dL      BUN 42 mg/dL      Creatinine 2.90 mg/dL      Sodium 141 mmol/L      Potassium 3.8 mmol/L      Chloride 107 mmol/L      CO2 22.0 mmol/L      Calcium 8.8 mg/dL      Total Protein 6.4 g/dL      Albumin 3.40 g/dL      ALT (SGPT) 43 U/L      AST (SGOT) 39 U/L      Alkaline Phosphatase 97 U/L      Total Bilirubin 0.9 mg/dL      eGFR Non African Amer 22 mL/min/1.73      Globulin 3.0 gm/dL      A/G Ratio 1.1 g/dL      BUN/Creatinine Ratio 14.5     Anion Gap 15.8 mmol/L     Narrative:       GFR Normal >60  Chronic Kidney Disease <60  Kidney Failure <15    CBC (No Diff) [[id number]]  (Abnormal) Collected:  12/15/18 0545    Specimen:  Blood Updated:  12/15/18 0629     WBC 16.49 10*3/mm3      RBC 3.07 10*6/mm3      Hemoglobin 9.9 g/dL      Hematocrit 29.2 %      MCV 95.1 fL      MCH 32.2 pg      MCHC 33.9 g/dL      RDW 12.5 %      RDW-SD 43.4 fl      MPV 8.9 fL      Platelets 286 10*3/mm3     Lactic Acid, Plasma [[id number]]  (Normal) Collected:  12/14/18 1832    Specimen:  Blood Updated:  12/14/18 1852     Lactate 0.7 mmol/L     Comprehensive Metabolic Panel [[id number]]  (Abnormal) Collected:  12/14/18 1737    Specimen:  Blood Updated:  12/14/18 1757     Glucose 115 mg/dL      BUN 46 mg/dL      Creatinine 3.20 mg/dL      Sodium 138 mmol/L      Potassium 4.1 mmol/L      Chloride 99 mmol/L      CO2 22.0 mmol/L      Calcium 9.9 mg/dL      Total Protein 8.1 g/dL      Albumin 4.50 g/dL      ALT (SGPT) 38 U/L      AST (SGOT) 46 U/L      Alkaline Phosphatase 123 U/L      Total Bilirubin 1.3 mg/dL      eGFR Non African Amer 20 mL/min/1.73      Globulin 3.6 gm/dL      A/G Ratio 1.3 g/dL      BUN/Creatinine Ratio 14.4     Anion Gap 21.1 mmol/L     Narrative:       GFR Normal >60  Chronic Kidney Disease <60  Kidney Failure <15    Influenza Antigen, Rapid -  Swab, Nasopharynx [[id number]]  (Normal) Collected:  12/14/18 1737    Specimen:  Swab from Nasopharynx Updated:  12/14/18 1754     Influenza A Ag, EIA Negative     Influenza B Ag, EIA Negative    CBC & Differential [[id number]] Collected:  12/14/18 1737    Specimen:  Blood Updated:  12/14/18 1745    Narrative:       The following orders were created for panel order CBC & Differential.  Procedure                               Abnormality         Status                     ---------                               -----------         ------                     CBC Auto Differential[[id number]]        Abnormal            Final result                 Please view results for these tests on the individual orders.    CBC Auto Differential [[id number]]  (Abnormal) Collected:  12/14/18 1737    Specimen:  Blood Updated:  12/14/18 1745     WBC 21.01 10*3/mm3      RBC 3.72 10*6/mm3      Hemoglobin 12.0 g/dL      Hematocrit 35.0 %      MCV 94.1 fL      MCH 32.3 pg      MCHC 34.3 g/dL      RDW 12.2 %      RDW-SD 42.5 fl      MPV 8.5 fL      Platelets 343 10*3/mm3      Neutrophil % 87.3 %      Lymphocyte % 3.5 %      Monocyte % 6.9 %      Eosinophil % 0.0 %      Basophil % 0.3 %      Immature Grans % 2.0 %      Neutrophils, Absolute 18.32 10*3/mm3      Lymphocytes, Absolute 0.74 10*3/mm3      Monocytes, Absolute 1.46 10*3/mm3      Eosinophils, Absolute 0.01 10*3/mm3      Basophils, Absolute 0.06 10*3/mm3      Immature Grans, Absolute 0.42 10*3/mm3      nRBC 0.0 /100 WBC         Imaging Results (last 24 hours)     Procedure Component Value Units Date/Time    XR Chest 2 View [[id number]] Collected:  12/14/18 1738     Updated:  12/14/18 1739    Narrative:       FINAL REPORT    CLINICAL HISTORY:  soa, cough    COMPARISON:  None.    FINDINGS:  There is a hazy left lung and in frontal view.  Lateral view  demonstrates increased density  across the lower  thoracic spine  and findings are highly suggestive of a developing  consolidation in the  posterior left lung base. No effusion or  pneumothorax. Heart size is normal. No acute bony abnormality.      Impression:       Findings suggestive of a developing consolidation in the  posterior left lung base  that could represent a pneumonia given  the history.  Appropriate treatment and followup x-rays in 6  weeks is recommended to ensure proper resolution.    Authenticated by Madhu Reddy MD on 12/14/2018 05:38:45 PM

## 2018-12-16 ENCOUNTER — APPOINTMENT (OUTPATIENT)
Dept: CT IMAGING | Facility: HOSPITAL | Age: 58
End: 2018-12-16

## 2018-12-16 LAB
ANION GAP SERPL CALCULATED.3IONS-SCNC: 10.7 MMOL/L (ref 10–20)
BACTERIA UR QL AUTO: ABNORMAL /HPF
BASOPHILS # BLD AUTO: 0.03 10*3/MM3 (ref 0–0.2)
BASOPHILS NFR BLD AUTO: 0.3 % (ref 0–2.5)
BILIRUB UR QL STRIP: NEGATIVE
BUN BLD-MCNC: 34 MG/DL (ref 7–20)
BUN/CREAT SERPL: 12.6 (ref 6.3–21.9)
CALCIUM SPEC-SCNC: 9.2 MG/DL (ref 8.4–10.2)
CHLORIDE SERPL-SCNC: 112 MMOL/L (ref 98–107)
CLARITY UR: CLEAR
CO2 SERPL-SCNC: 23 MMOL/L (ref 26–30)
COLOR UR: ABNORMAL
CREAT BLD-MCNC: 2.7 MG/DL (ref 0.6–1.3)
CREAT UR-MCNC: 97.8 MG/DL
DEPRECATED RDW RBC AUTO: 45.3 FL (ref 37–54)
EOSINOPHIL # BLD AUTO: 0.04 10*3/MM3 (ref 0–0.7)
EOSINOPHIL NFR BLD AUTO: 0.5 % (ref 0–7)
ERYTHROCYTE [DISTWIDTH] IN BLOOD BY AUTOMATED COUNT: 12.9 % (ref 11.5–14.5)
GFR SERPL CREATININE-BSD FRML MDRD: 24 ML/MIN/1.73
GLUCOSE BLD-MCNC: 108 MG/DL (ref 74–98)
GLUCOSE UR STRIP-MCNC: NEGATIVE MG/DL
HCT VFR BLD AUTO: 30.1 % (ref 42–52)
HGB BLD-MCNC: 9.9 G/DL (ref 14–18)
HGB UR QL STRIP.AUTO: NEGATIVE
HYALINE CASTS UR QL AUTO: ABNORMAL /LPF
IMM GRANULOCYTES # BLD: 0.16 10*3/MM3 (ref 0–0.06)
IMM GRANULOCYTES NFR BLD: 1.8 % (ref 0–0.6)
KETONES UR QL STRIP: ABNORMAL
LEUKOCYTE ESTERASE UR QL STRIP.AUTO: NEGATIVE
LYMPHOCYTES # BLD AUTO: 0.96 10*3/MM3 (ref 0.6–3.4)
LYMPHOCYTES NFR BLD AUTO: 10.9 % (ref 10–50)
MCH RBC QN AUTO: 31.5 PG (ref 27–31)
MCHC RBC AUTO-ENTMCNC: 32.9 G/DL (ref 30–37)
MCV RBC AUTO: 95.9 FL (ref 80–94)
MONOCYTES # BLD AUTO: 0.76 10*3/MM3 (ref 0–0.9)
MONOCYTES NFR BLD AUTO: 8.7 % (ref 0–12)
MUCOUS THREADS URNS QL MICRO: ABNORMAL /HPF
NEUTROPHILS # BLD AUTO: 6.82 10*3/MM3 (ref 2–6.9)
NEUTROPHILS NFR BLD AUTO: 77.8 % (ref 37–80)
NITRITE UR QL STRIP: NEGATIVE
NRBC BLD MANUAL-RTO: 0 /100 WBC (ref 0–0)
PH UR STRIP.AUTO: 5.5 [PH] (ref 5–8)
PHOSPHATE SERPL-MCNC: 5 MG/DL (ref 2.5–4.5)
PLATELET # BLD AUTO: 306 10*3/MM3 (ref 130–400)
PMV BLD AUTO: 8.8 FL (ref 6–12)
POTASSIUM BLD-SCNC: 3.7 MMOL/L (ref 3.5–5.1)
PROT UR QL STRIP: ABNORMAL
RBC # BLD AUTO: 3.14 10*6/MM3 (ref 4.7–6.1)
RBC # UR: ABNORMAL /HPF
REF LAB TEST METHOD: ABNORMAL
SODIUM BLD-SCNC: 142 MMOL/L (ref 137–145)
SODIUM UR-SCNC: 59 MMOL/L (ref 30–90)
SP GR UR STRIP: 1.02 (ref 1–1.03)
SQUAMOUS #/AREA URNS HPF: ABNORMAL /HPF
UROBILINOGEN UR QL STRIP: ABNORMAL
WBC NRBC COR # BLD: 8.77 10*3/MM3 (ref 4.8–10.8)
WBC UR QL AUTO: ABNORMAL /HPF

## 2018-12-16 PROCEDURE — 85025 COMPLETE CBC W/AUTO DIFF WBC: CPT | Performed by: HOSPITALIST

## 2018-12-16 PROCEDURE — 81001 URINALYSIS AUTO W/SCOPE: CPT | Performed by: HOSPITALIST

## 2018-12-16 PROCEDURE — 80048 BASIC METABOLIC PNL TOTAL CA: CPT | Performed by: HOSPITALIST

## 2018-12-16 PROCEDURE — 25010000002 CEFTRIAXONE SODIUM-DEXTROSE 1-3.74 GM-%(50ML) RECONSTITUTED SOLUTION: Performed by: HOSPITALIST

## 2018-12-16 PROCEDURE — 25010000002 AZITHROMYCIN: Performed by: HOSPITALIST

## 2018-12-16 PROCEDURE — 83520 IMMUNOASSAY QUANT NOS NONAB: CPT | Performed by: INTERNAL MEDICINE

## 2018-12-16 PROCEDURE — 86038 ANTINUCLEAR ANTIBODIES: CPT | Performed by: INTERNAL MEDICINE

## 2018-12-16 PROCEDURE — 86256 FLUORESCENT ANTIBODY TITER: CPT | Performed by: INTERNAL MEDICINE

## 2018-12-16 PROCEDURE — 80074 ACUTE HEPATITIS PANEL: CPT | Performed by: INTERNAL MEDICINE

## 2018-12-16 PROCEDURE — 82570 ASSAY OF URINE CREATININE: CPT | Performed by: HOSPITALIST

## 2018-12-16 PROCEDURE — 86160 COMPLEMENT ANTIGEN: CPT | Performed by: INTERNAL MEDICINE

## 2018-12-16 PROCEDURE — 99232 SBSQ HOSP IP/OBS MODERATE 35: CPT | Performed by: HOSPITALIST

## 2018-12-16 PROCEDURE — 84100 ASSAY OF PHOSPHORUS: CPT | Performed by: INTERNAL MEDICINE

## 2018-12-16 PROCEDURE — 74176 CT ABD & PELVIS W/O CONTRAST: CPT

## 2018-12-16 PROCEDURE — 83970 ASSAY OF PARATHORMONE: CPT | Performed by: INTERNAL MEDICINE

## 2018-12-16 PROCEDURE — 84155 ASSAY OF PROTEIN SERUM: CPT | Performed by: INTERNAL MEDICINE

## 2018-12-16 PROCEDURE — 84300 ASSAY OF URINE SODIUM: CPT | Performed by: HOSPITALIST

## 2018-12-16 PROCEDURE — 84165 PROTEIN E-PHORESIS SERUM: CPT | Performed by: INTERNAL MEDICINE

## 2018-12-16 PROCEDURE — 83516 IMMUNOASSAY NONANTIBODY: CPT | Performed by: INTERNAL MEDICINE

## 2018-12-16 RX ORDER — CEFTRIAXONE 1 G/50ML
1 INJECTION, SOLUTION INTRAVENOUS EVERY 24 HOURS
Status: DISCONTINUED | OUTPATIENT
Start: 2018-12-16 | End: 2018-12-18 | Stop reason: HOSPADM

## 2018-12-16 RX ORDER — L.ACID,PARA/B.BIFIDUM/S.THERM 8B CELL
1 CAPSULE ORAL DAILY
Status: DISCONTINUED | OUTPATIENT
Start: 2018-12-17 | End: 2018-12-18 | Stop reason: HOSPADM

## 2018-12-16 RX ORDER — GUAIFENESIN/DEXTROMETHORPHAN 100-10MG/5
5 SYRUP ORAL EVERY 4 HOURS PRN
Status: DISCONTINUED | OUTPATIENT
Start: 2018-12-16 | End: 2018-12-18 | Stop reason: HOSPADM

## 2018-12-16 RX ADMIN — AZITHROMYCIN 500 MG: 500 INJECTION, POWDER, LYOPHILIZED, FOR SOLUTION INTRAVENOUS at 20:20

## 2018-12-16 RX ADMIN — CEFTRIAXONE 1 G: 1 INJECTION, SOLUTION INTRAVENOUS at 17:49

## 2018-12-16 RX ADMIN — DULOXETINE HYDROCHLORIDE 60 MG: 30 CAPSULE, DELAYED RELEASE ORAL at 09:18

## 2018-12-16 RX ADMIN — GUAIFENESIN AND DEXTROMETHORPHAN 5 ML: 100; 10 SYRUP ORAL at 20:14

## 2018-12-16 RX ADMIN — BACLOFEN 10 MG: 10 TABLET ORAL at 09:18

## 2018-12-16 RX ADMIN — BACLOFEN 10 MG: 10 TABLET ORAL at 20:14

## 2018-12-16 NOTE — PLAN OF CARE
Problem: Renal Failure/Kidney Injury, Acute (Adult)  Goal: Signs and Symptoms of Listed Potential Problems Will be Absent, Minimized or Managed (Renal Failure/Kidney Injury, Acute)  Outcome: Ongoing (interventions implemented as appropriate)   12/16/18 7129   Goal/Outcome Evaluation   Problems Assessed (Acute Renal Failure/Kidney Injury) all   Problems Present (ARF/Kidney Injury) fluid imbalance;situational response

## 2018-12-16 NOTE — CONSULTS
"  Referring Provider: Hospitalist  Reason for Consultation: renal issues    Patient Care Team:  Tomas Almodovar DO as PCP - General (Family Medicine)    Chief complaint     Subjective . Weakness/not eating    History of present illness:     57 Y/O WM with recent history of L ureteral stone admitted via wife's urging for weakness, chills, and poor intake for almost a week  He was diagnosed with PNA and has been on IVF\"s and antbx    He is unsure if November kidney stone has passed. He did have hematuria and pain last month, none today  HE is still not eating, but no N/V/D    Denies NSAID use  No FH of kidneys disease/stones    ? Dx of rheumatoid arthritis-- \"old DX, never on any specific meds\"    Review of Systems    General : No pain, ? Weight loss  HEENT: No headache, no nosebleed, no sore throat, no blurry vision  Chest : no chest pain, no palpitations, no chest wall pain  Respiratory: + cough, no SOA, no BAPTISTE,  no orthopnea  GI:  No N/V/D, no abdominal pain, no BRBPR, no melena  Skin : no rashes, no pruritus  Musculoskeletal : no flank pain, no joint effusions  Neuro : + weakness, no numbness, no dizziness, no double vision  Endocrine: no heat/cold intolerance,  Genitourinary: no dysuria or hematuria, no frequency  Psychiatric: no insomnia, no depression, no anxiety  Heme: no easy bruising or bleeding  Vascular: no cyanosis or extremity pain        Past Medical History:   Diagnosis Date   • GERD (gastroesophageal reflux disease)    • Injury of back    • Kidney stone    • RA (rheumatoid arthritis) (CMS/[facility])      Past Surgical History:   Procedure Laterality Date   • COLONOSCOPY     • LASIK     • TESTICLE SURGERY       History reviewed. No pertinent family history.  Social History     Tobacco Use   • Smoking status: Never Smoker   • Smokeless tobacco: Never Used   Substance Use Topics   • Alcohol use: No   • Drug use: No     FH - neg for kidney disease or stones  Medications Prior to Admission   Medication Sig " Dispense Refill Last Dose   • baclofen (LIORESAL) 10 MG tablet Take 10 mg by mouth 2 (Two) Times a Day.   12/14/2018 at Unknown time   • celecoxib (CeleBREX) 200 MG capsule Take 200 mg by mouth Daily.   12/13/2018 at Unknown time   • DULoxetine (CYMBALTA) 60 MG capsule Take 60 mg by mouth 2 (Two) Times a Day.   12/14/2018 at Unknown time   • gabapentin (NEURONTIN) 600 MG tablet Take 600 mg by mouth 3 (Three) Times a Day.   12/14/2018 at Unknown time   • ondansetron ODT (ZOFRAN-ODT) 4 MG disintegrating tablet Take 1 tablet by mouth Every 8 (Eight) Hours As Needed for Nausea or Vomiting. 12 tablet 0 12/14/2018 at Unknown time   • TRAMADOL HCL PO Take 50 mg by mouth 3 (Three) Times a Day.   12/14/2018 at Unknown time       ceftriaxone 1 g Intravenous Q24H   And      azithromycin 500 mg Intravenous Q24H   baclofen 10 mg Oral BID   DULoxetine 60 mg Oral Daily   ipratropium-albuterol 3 mL Nebulization 4x Daily - RT       sodium chloride 125 mL/hr Last Rate: 125 mL/hr (12/16/18 0926)     Allergies:   Patient has no known allergies.    Objective     Vital Signs   Temp:  [98.3 °F (36.8 °C)-98.9 °F (37.2 °C)] 98.4 °F (36.9 °C)  Heart Rate:  [70-89] 70  Resp:  [16-18] 17  BP: (120-131)/(62-72) 131/72      Intake/Output Summary (Last 24 hours) at 12/16/2018 1205  Last data filed at 12/16/2018 0839  Gross per 24 hour   Intake 1440 ml   Output 1050 ml   Net 390 ml       Physical Exam:      General Appearance:    Lethargic,  in no acute distress   Head:    Normocephalic, without obvious abnormality, atraumatic   Eyes:           conjunctivae and sclerae normal, no icterus, no pallor   Ears:     no ear canal drainage   Throat:   No oral lesions, no thrush, oral mucosa dry   Neck:   No adenopathy, supple,  no JVD   Back:      No tenderness to percussion or palpation   Lungs:     respirations regular, even and unlabored, no wheezes or rhonchi    Heart:    Regular rhythm and normal rate, normal S1 and S2, no            murmur, no  gallop, no rub   Chest Wall:    No abnormalities observed, no chest wall tenderness to palpation   Abdomen:     Normal bowel sounds, no masses,  soft non-tender, non-distended, no guarding, no rebound    Rectal:     Deferred   Extremities:    No Lower extremity edema, no cyanosis   Pulses:   Radial Pulses palpable   Skin:   No bleeding, bruising or rashes   Lymph nodes:   No palpable adenopathy in neck    Neurologic:   Cranial nerves 2 - 12 grossly intact, no gross motor deficits          Results Review:     Results from last 7 days   Lab Units  12/16/18   0537  12/15/18   0545  12/14/18   1737   SODIUM mmol/L  142  141  138   POTASSIUM mmol/L  3.7  3.8  4.1   CHLORIDE mmol/L  112*  107  99   CO2 mmol/L  23.0*  22.0*  22.0*   BUN mg/dL  34*  42*  46*   CREATININE mg/dL  2.70*  2.90*  3.20*   GLUCOSE mg/dL  108*  102*  115*   CALCIUM mg/dL  9.2  8.8  9.9     Results from last 7 days   Lab Units  12/16/18   0537  12/15/18   0545  12/14/18   1737   WBC 10*3/mm3  8.77  16.49*  21.01*   HEMOGLOBIN g/dL  9.9*  9.9*  12.0*   HEMATOCRIT %  30.1*  29.2*  35.0*   PLATELETS 10*3/mm3  306  286  343           Assessment/Plan       Community acquired pneumonia of left lower lobe of lung (CMS/HCC)    RA (rheumatoid arthritis) (CMS/HCC)    GERD (gastroesophageal reflux disease)    Acute kidney injury (CMS/HCC)    Dehydration    1. NAZ on ? CKD -- creatinine was 1.3 in ER in November with stones.  Certainly some pre-renal stress with dehydration and celebrex (stopped)       Seems to be improving with IVF's.  With recent stone issue need to re-do CT scan.  Will also run full serologies with microhematuria and H/O RA    2. Hypovolemia-  Cont IVF's    3. Microhematuria- on UA in November, repeat ordered.  Check UA and CT scan    4. Hypercalcemia on nOV labs-- Check PTH \PO4  with kidney stone    5. Nephrolithiasis- recheck CT    6. ? RA history- check RF      Plan:  CT scan  Serologies for GN + PTH/calcium  Cont IVFs  Am labs

## 2018-12-16 NOTE — PROGRESS NOTES
[facility]    Name: Toribio Dunaway, 58 y.o. male  MRN: [id number], : 1960   Date of Admission: 2018   Date of Service: 18   PCP: Tomas Almodovar DO     Hospital Course:   Mr. Dunaway is a 59 yo M with h/o GERD, RA, renal stones who was admitted on 18 for community acquired pneumonia (fever, cough with blood tinged mucus, poor appetite x 6 days). Vitals on admission notable for , O2 sat appropriate on RA. Labs on admission notable for WBC 21K with left shift; hgb 12; BUN/Cr 46/3.2; Glc 115; Influenza A/B: negative; BCx x 2 obtained.  Radiographs on admission notable for CXR: \"Findings suggestive of a developing consolidation in the posterior left lung base that could represent a pneumonia given the history.  Appropriate treatment and followup x-rays in 6 weeks is recommended to ensure proper resolution.\". Patient was given rocephin, azithromycin, baclofen, duoneb, NS 3L bolus, tylenol in ER. Creatinine is slowly improving. Not taking much PO. Continued on IV fluids. Urine studies ordered. Dr. Hoff with nephrology consulted.    Consultants: Dr. Hoff (nephrology)  Procedures: none    Antibiotics:   Azithromycin d3  Rocephin d3  Micro:    BCx: ngtd x 2   Influenza A/B: negative    -----------------------------------------------------------------------------------------------------------------------------------------------------------------------------------------------------------------------------------------------------  Subjective   Chief Complaint: f/u pneumonia     Subjective:   Patient seen and examined this morning. Events from last night noted, was seeing green spots. Discussed with MIRIAN Downs. Patient states he is not feeling well this morning and did not get much sleep last night due to seeing spots. The spots have dissipated this morning. Breathing has improved, but endorses coughing spells. Endorses urinary hesitancy " "on voiding, wife states this has been going on for \"a while\"    Review of Systems:   Gen-no fevers, no chills  CV-no chest pain, no palpitations  GI-no N/V/D, no abd pain    Objective   Objective:     Intake/Output Summary (Last 24 hours) at 12/16/2018 0950  Last data filed at 12/16/2018 0839  Gross per 24 hour   Intake 1440 ml   Output 1050 ml   Net 390 ml      SpO2: 96 %     Physical Examination:   Vitals:    12/16/18 0000 12/16/18 0400 12/16/18 0500 12/16/18 0748   BP: 123/67 121/70  131/72   BP Location: Left arm Left arm     Patient Position: Lying Lying     Pulse: 89 73  70   Resp: 17 16  17   Temp: 98.9 °F (37.2 °C) 98.3 °F (36.8 °C)  98.4 °F (36.9 °C)   TempSrc: Oral Oral  Oral   SpO2:    96%   Weight:   85.1 kg (187 lb 9.6 oz)    Height:          General:  Pleasant, elderly male, no acute distress; dry mucous membranes  Heart:   RRR, S1 S2 normal, no m/r/g  Lungs:   Rhonchi at left base; no wheezes  Abdomen:  soft, NT, ND, +BS  Extremities: no edema/cyanosis/clubbing  Neuro:  A&Ox3, no focal deficits  Psych:  appropriate mood  Skin:  No bleeding, bruising, or rash    Pertinent laboratory and radiology data were reviewed:  Microbiology Results (last 10 days)     Procedure Component Value - Date/Time    Blood Culture - Blood, Hand, Right [[id number]] Collected:  12/14/18 1834    Lab Status:  Preliminary result Specimen:  Blood from Hand, Right Updated:  12/15/18 1901     Blood Culture No growth at 24 hours    Blood Culture - Blood, Hand, Left [[id number]] Collected:  12/14/18 1832    Lab Status:  Preliminary result Specimen:  Blood from Hand, Left Updated:  12/15/18 1846     Blood Culture No growth at 24 hours    Influenza Antigen, Rapid - Swab, Nasopharynx [[id number]]  (Normal) Collected:  12/14/18 1737    Lab Status:  Final result Specimen:  Swab from Nasopharynx Updated:  12/14/18 1754     Influenza A Ag, EIA Negative     Influenza B Ag, EIA Negative          Medications Reviewed:     ceftriaxone 1 g " Intravenous Q24H   And      azithromycin 500 mg Intravenous Q24H   baclofen 10 mg Oral BID   DULoxetine 60 mg Oral Daily   ipratropium-albuterol 3 mL Nebulization 4x Daily - RT        Assessment /Plan   Assessment/Plan:   1. Community acquired left lower lobe pneumonia.   · Azithromycin, rocephin x 5/7 days; nebs  · Leukocytosis normalized  · Add cough syrup prn  · F/u BCx    2. Acute renal failure due to dehydration/poor PO intake  · Creatinine improving slowly. Continue IVF. Still not drinking much on his own per wife.  · Will check post-void residual with bladder scan, patient endorsed urinary hesitancy, possible BPH  · Consulted Dr. Hoff with nephrology  · Strict I/Os; labs in AM  · UA ordered yesterday, but some issue with collection/labeling, will re-order today along with urine sodium/creatinine  · Continue to encourage PO intake as well    3. H/o hematuria. Repeat UA pending    4. Visual hallucinations, overnight. Unclear etiology. ?delirium. Do not see any medication offenders. Has resolved this morning.     FEN: cardiac  DVT Prophylaxis: SCDs  PUD Prophylaxis: not indicated    Reason for continued hospitalization: above  Disposition: pending nephrology evaluation and urine studies  Discussed with: patient, wife and RN Yareli Shin MD   9:50 AM on 12/16/2018

## 2018-12-16 NOTE — PLAN OF CARE
Problem: Pneumonia (Adult)  Goal: Signs and Symptoms of Listed Potential Problems Will be Absent, Minimized or Managed (Pneumonia)  Outcome: Ongoing (interventions implemented as appropriate)   12/16/18 3144   Goal/Outcome Evaluation   Problems Assessed (Pneumonia) all   Problems Present (Pneumonia) fluid/electrolyte imbalance;respiratory compromise

## 2018-12-16 NOTE — PLAN OF CARE
Problem: Patient Care Overview  Goal: Plan of Care Review  Outcome: Ongoing (interventions implemented as appropriate)   12/16/18 5788   Coping/Psychosocial   Plan of Care Reviewed With patient   Plan of Care Review   Progress improving

## 2018-12-17 LAB
ANA SER QL: NEGATIVE
ANION GAP SERPL CALCULATED.3IONS-SCNC: 11.9 MMOL/L (ref 10–20)
BUN BLD-MCNC: 31 MG/DL (ref 7–20)
BUN/CREAT SERPL: 11.9 (ref 6.3–21.9)
C3 SERPL-MCNC: 158 MG/DL (ref 82–167)
C4 SERPL-MCNC: 22 MG/DL (ref 14–44)
CALCIUM SERPL-MCNC: 9.3 MG/DL (ref 8.7–10.2)
CALCIUM SPEC-SCNC: 9.1 MG/DL (ref 8.4–10.2)
CHLORIDE SERPL-SCNC: 116 MMOL/L (ref 98–107)
CO2 SERPL-SCNC: 21 MMOL/L (ref 26–30)
CREAT BLD-MCNC: 2.6 MG/DL (ref 0.6–1.3)
GFR SERPL CREATININE-BSD FRML MDRD: 25 ML/MIN/1.73
GLUCOSE BLD-MCNC: 109 MG/DL (ref 74–98)
INTACT PTH: NORMAL
Lab: NORMAL
POTASSIUM BLD-SCNC: 3.9 MMOL/L (ref 3.5–5.1)
PTH-INTACT SERPL-MCNC: 31 PG/ML (ref 15–65)
SODIUM BLD-SCNC: 145 MMOL/L (ref 137–145)

## 2018-12-17 PROCEDURE — 25010000002 CEFTRIAXONE SODIUM-DEXTROSE 1-3.74 GM-%(50ML) RECONSTITUTED SOLUTION: Performed by: HOSPITALIST

## 2018-12-17 PROCEDURE — 94799 UNLISTED PULMONARY SVC/PX: CPT

## 2018-12-17 PROCEDURE — 99232 SBSQ HOSP IP/OBS MODERATE 35: CPT | Performed by: FAMILY MEDICINE

## 2018-12-17 PROCEDURE — 25010000002 AZITHROMYCIN: Performed by: HOSPITALIST

## 2018-12-17 PROCEDURE — 80048 BASIC METABOLIC PNL TOTAL CA: CPT | Performed by: HOSPITALIST

## 2018-12-17 RX ORDER — OMEGA-3 FATTY ACIDS/FISH OIL 300-1000MG
1 CAPSULE ORAL DAILY
COMMUNITY

## 2018-12-17 RX ORDER — ASPIRIN 81 MG/1
81 TABLET ORAL DAILY
COMMUNITY

## 2018-12-17 RX ORDER — ASCORBIC ACID 500 MG
500 TABLET ORAL DAILY
COMMUNITY

## 2018-12-17 RX ADMIN — BACLOFEN 10 MG: 10 TABLET ORAL at 08:50

## 2018-12-17 RX ADMIN — DULOXETINE HYDROCHLORIDE 60 MG: 30 CAPSULE, DELAYED RELEASE ORAL at 08:50

## 2018-12-17 RX ADMIN — Medication 1 CAPSULE: at 08:50

## 2018-12-17 RX ADMIN — CEFTRIAXONE 1 G: 1 INJECTION, SOLUTION INTRAVENOUS at 17:33

## 2018-12-17 RX ADMIN — BACLOFEN 10 MG: 10 TABLET ORAL at 21:22

## 2018-12-17 RX ADMIN — AZITHROMYCIN 500 MG: 500 INJECTION, POWDER, LYOPHILIZED, FOR SOLUTION INTRAVENOUS at 21:21

## 2018-12-17 RX ADMIN — SODIUM CHLORIDE 125 ML/HR: 9 INJECTION, SOLUTION INTRAVENOUS at 12:20

## 2018-12-17 NOTE — PROGRESS NOTES
[facility]    [facility]    Name:  Toribio Dunaway   Age:  58 y.o.  Sex:  male  :  1960  MRN:  [id number]   Visit Number:  [id number]  Admission Date:  2018  Date Of Service:  18  Primary Care Physician:  Talya Venegas MD     LOS: 2 days :      Chief Complaint:  Follow up pneumonia.        Subjective / Interval History: The patient was admitted for pneumonia and acute kidney injury.  He has been hydrated and improving slowly.  He did have some confusion earlier in his stay which has since resolved.  He does still have flat affect but denies any mood changes.  He has had a very poor appetite and has hardly been eating or drinking anything yet.  No acute events occurred overnight.  He has improved cough and congestion.  He had some edema and his abdomen improved today after refusing further IV fluids.  He denies shortness of breath or edema.  He has not been ambulating and his wife reports that she will help him walk in the hallway today.      Review of Systems:     General ROS: Patient denies any fevers, chills or loss of consciousness.  Ophthalmic ROS: Denies any diplopia or transient loss of vision.  ENT ROS: Denies sore throat, nasal congestion or ear pain.   Respiratory ROS: Improved cough and shortness of breath.  Cardiovascular ROS: Denies chest pain or palpitations. No history of exertional chest pain.   Gastrointestinal ROS: Denies nausea and vomiting. Denies any abdominal pain. No diarrhea.  Genito-Urinary ROS: Denies dysuria or hematuria.  Musculoskeletal ROS: Denies chronic back pain. No muscle pain. No calf pain.  Neurological ROS: Denies any focal weakness. No loss of consciousness. Denies any numbness. Denies neck pain.   Dermatological ROS: Denies any redness or pruritis.    Vital Signs:    Temp:  [97.8 °F (36.6 °C)-99 °F (37.2 °C)] 97.9 °F (36.6 °C)  Heart Rate:  [61-95] 61  Resp:  [17-18] 18  BP: (134-146)/(67-84) 140/73    Intake and  output:    I/O last 3 completed shifts:  In: 1410 [P.O.:290; I.V.:1120]  Out: 1650 [Urine:1650]  I/O this shift:  In: 340 [P.O.:340]  Out: 500 [Urine:500]    Physical Examination:    General Appearance:    Alert and cooperative, not in any acute distress.Flat affect   Head:    Atraumatic and normocephalic, without obvious abnormality.   Eyes:            PERRLA, conjunctivae and sclerae normal, no Icterus. No pallor. Extraocular movements are within normal limits.   Throat:   No oral lesions, no thrush, oral mucosa moist.   Neck:   Supple, trachea midline, no thyromegaly, no carotid bruit.   Lungs:     Chest shape is normal. Breath sounds heard bilaterally equally.  Left lower lobe trace crackles no wheezing. No Pleural rub or bronchial breathing.    Heart:    Normal S1 and S2, no murmur, no gallop   Abdomen:     Normal bowel sounds, no masses, no organomegaly. Soft        non-tender, non-distended, no guarding, no rebound                tenderness   Extremities:   Moves all extremities well, no edema, no cyanosis, no             clubbing   Skin:   No bleeding, bruising or rash.   Neurologic:   Cranial nerves 2 - 12 grossly intact, sensation intact, Motor power is normal and equal bilaterally.   Laboratory results:    Lab Results (last 24 hours)     Procedure Component Value Units Date/Time    C4+C3 [[id number]] Collected:  12/16/18 1233    Specimen:  Blood Updated:  12/17/18 0755     C3 Complement 158 mg/dL      C4 Complement 22 mg/dL     Narrative:       Performed at:  [address] [place] [address]  [id number]  : Klever Reyez PhD, Phone:  [phone]    Antinuclear Antibody With Reflex Cascade [[id number]] Collected:  12/16/18 1233    Specimen:  Blood Updated:  12/17/18 1801     See below: Comment     Comment: Autoantibody                       Disease Association  ____________________________________________________________                          Condition                   Frequency  _____________________   ________________________   _________  Antinuclear Antibody,    SLE, mixed connective  Direct (NAKIA-D)           tissue diseases  _____________________   ________________________   _________  dsDNA                    SLE                        40 - 60%  _____________________   ________________________   _________  Chromatin                Drug induced SLE                90%                           SLE                        48 - 97%  _____________________   ________________________   _________  SSA (Ro)                 SLE                        25 - 35%                           Sjogren's Syndrome         40 - 70%                            Lupus                 100%  _____________________   ________________________   _________  SSB (La)                 SLE                             10%                           Sjogren's Syndrome              30%  _____________________   _______________________    _________  Sm (anti-Smith)          SLE                        15 - 30%  _____________________   _______________________    _________  RNP                      Mixed Connective Tissue                           Disease                         95%  (U1 nRNP,                SLE                        30 - 50%  anti-ribonucleoprotein)  Polymyositis and/or                           Dermatomyositis                 20%  _____________________   ________________________   _________  Scl-70 (antiDNA          Scleroderma (diffuse)      20 - 35%  topoisomerase)           Crest                           13%  _____________________   ________________________   _________  Liliana-1                     Polymyositis and/or                           Dermatomyositis            20 - 40%  _____________________   ________________________   _________  Centromere B             Scleroderma - Crest                           variant                         80%  _____________________   ________________________    _________  Ribosomal P              SLE                        10 - 20%        NAKIA Direct Negative     Comment: **Effective January 7, 2019, test 102305 Antinuclear Ab Reflex**    [place] will be made nonorderable. There is not a direct replacement    test. [facility] offers several NAKIA test options. Please refer to the    [facility] Directory of Services.       Narrative:       Performed at:  [address]  [id number]  : Klever Reyez PhD, Phone:  [phone]    PTH, Intact & Calcium [[id number]] Collected:  12/16/18 1233    Specimen:  Blood Updated:  12/17/18 1309     Calcium 9.3 mg/dL      PTH, Intact 31 pg/mL      PTH, Intact Comment     Comment: Interpretation                 Intact PTH    Calcium                                  (pg/mL)      (mg/dL)  Normal                          15 - 65     8.6 - 10.2  Primary Hyperparathyroidism         >65          >10.2  Secondary Hyperparathyroidism       >65          <10.2  Non-Parathyroid Hypercalcemia       <65          >10.2  Hypoparathyroidism                  <15          < 8.6  Non-Parathyroid Hypocalcemia    15 - 65          < 8.6       Narrative:       Performed at:  [address]  [id number]  : Klever Reyez PhD, Phone:  [phone]    Basic Metabolic Panel [[id number]]  (Abnormal) Collected:  12/17/18 0551    Specimen:  Blood Updated:  12/17/18 0632     Glucose 109 mg/dL      BUN 31 mg/dL      Creatinine 2.60 mg/dL      Sodium 145 mmol/L      Potassium 3.9 mmol/L      Chloride 116 mmol/L      CO2 21.0 mmol/L      Calcium 9.1 mg/dL      eGFR Non African Amer 25 mL/min/1.73      BUN/Creatinine Ratio 11.9     Anion Gap 11.9 mmol/L     Narrative:       GFR Normal >60  Chronic Kidney Disease <60  Kidney Failure <15    Blood Culture - Blood, Hand, Right [[id number]] Collected:  12/14/18 1834    Specimen:  Blood from Hand, Right Updated:  12/16/18 1901     Blood Culture No  growth at 2 days    Blood Culture - Blood, Hand, Left [[id number]] Collected:  12/14/18 1832    Specimen:  Blood from Hand, Left Updated:  12/16/18 1846     Blood Culture No growth at 2 days          I have reviewed the patient's laboratory results.    Radiology results:    Imaging Results (last 24 hours)     ** No results found for the last 24 hours. **          I have reviewed the patient's radiology reports.    Medication Review:     I have reviewed the patients active and prn medications.     Assessment:    1. Community acquired left lower lobe pneumonia, prior to admission     2. Acute renal failure due to dehydration/poor PO intake, improved     3. H/o hematuria. Repeat UA resolved hematuria.     4. Visual hallucinations, possible acute delirium vs encephalopathy acute metabolic,resolved    5. Recently passed left small ureteral stone, per CT scan, asymptomatic    6. Dehydration, improved          Plan:  Continue current antibiotics, oxygen if needed and nebs. He is still not eating or drinking well and severely weak. Anticipate further improvement and discharge home in the next 1-2 days.   WBC improved. Creatinine improved. Patient refused further iv fluids today, feeling swollen in abdomen.     Ambulate today.     Medication risks and benefits were discussed in detail. Patient and wife reported satisfaction with care delivered and treatment plan.     Jazzy Arellano DO  12/17/18  4:04 PM

## 2018-12-17 NOTE — PROGRESS NOTES
Continued Stay Note  DHARMESH Sapp     Patient Name: Toribio Dunaway  MRN: [id number]  Today's Date: 12/17/2018    Admit Date: 12/14/2018    Discharge Plan     Row Name 12/17/18 1354       Plan    Plan  Home    Patient/Family in Agreement with Plan  yes    Plan Comments  F/U with pt in room re DCP; his wife and sister are bedside.  Pt denies an needs at this time and plans to return home.  CM will continue to follow.        Discharge Codes    No documentation.       Expected Discharge Date and Time     Expected Discharge Date Expected Discharge Time    Dec 18, 2018             Alyce Huggins

## 2018-12-17 NOTE — PLAN OF CARE
Problem: Patient Care Overview  Goal: Plan of Care Review  Outcome: Ongoing (interventions implemented as appropriate)   12/17/18 5809   Coping/Psychosocial   Plan of Care Reviewed With patient   Plan of Care Review   Progress improving

## 2018-12-17 NOTE — PROGRESS NOTES
"   LOS: 2 days    Patient Care Team:  Talya Venegas MD as PCP - General (Internal Medicine)    Chief Complaint:    Chief Complaint   Patient presents with   • Cough   • Nausea   • Nasal Congestion   • poor appetite     Follow UP NAZ   Subjective     Interval History:   Decent PO intake described.  No soa or n/v    Objective     Vital Signs  Temp:  [97.8 °F (36.6 °C)-99 °F (37.2 °C)] 97.9 °F (36.6 °C)  Heart Rate:  [61-95] 61  Resp:  [17-18] 18  BP: (134-146)/(67-84) 140/73    Flowsheet Rows      First Filed Value   Admission Height  180.3 cm (71\") Documented at 12/14/2018 1650   Admission Weight  81.5 kg (179 lb 9.6 oz) Documented at 12/14/2018 1650          I/O this shift:  In: 340 [P.O.:340]  Out: 500 [Urine:500]  I/O last 3 completed shifts:  In: 1410 [P.O.:290; I.V.:1120]  Out: 1650 [Urine:1650]    Intake/Output Summary (Last 24 hours) at 12/17/2018 1834  Last data filed at 12/17/2018 1237  Gross per 24 hour   Intake 1460 ml   Output 950 ml   Net 510 ml       Physical Exam:  gen nad, alert  Neck supple no JVD  Lungs CTA bilat no rales  CV RRR no m/g  Abd soft NT/ND  vasc no pedal edema 2+ radial pulses     Results Review:    Results from last 7 days   Lab Units  12/17/18   0551  12/16/18   1233  12/16/18   0537  12/15/18   0545  12/14/18   1737   SODIUM mmol/L  145   --   142  141  138   POTASSIUM mmol/L  3.9   --   3.7  3.8  4.1   CHLORIDE mmol/L  116*   --   112*  107  99   CO2 mmol/L  21.0*   --   23.0*  22.0*  22.0*   BUN mg/dL  31*   --   34*  42*  46*   CREATININE mg/dL  2.60*   --   2.70*  2.90*  3.20*   CALCIUM mg/dL  9.1  9.3  9.2  8.8  9.9   BILIRUBIN mg/dL   --    --    --   0.9  1.3   ALK PHOS U/L   --    --    --   97  123   ALT (SGPT) U/L   --    --    --   43  38   AST (SGOT) U/L   --    --    --   39  46   GLUCOSE mg/dL  109*   --   108*  102*  115*       Estimated Creatinine Clearance: 37.3 mL/min (A) (by C-G formula based on SCr of 2.6 mg/dL (H)).    Results from last 7 days   Lab Units  " 12/16/18   1233   PHOSPHORUS mg/dL  5.0*             Results from last 7 days   Lab Units  12/16/18   0537  12/15/18   0545  12/14/18   1737   WBC 10*3/mm3  8.77  16.49*  21.01*   HEMOGLOBIN g/dL  9.9*  9.9*  12.0*   PLATELETS 10*3/mm3  306  286  343               Imaging Results (last 24 hours)     ** No results found for the last 24 hours. **          ceftriaxone 1 g Intravenous Q24H   And      azithromycin 500 mg Intravenous Q24H   baclofen 10 mg Oral BID   DULoxetine 60 mg Oral Daily   ipratropium-albuterol 3 mL Nebulization 4x Daily - RT   lactobacillus acidophilus 1 capsule Oral Daily       sodium chloride 125 mL/hr Last Rate: 125 mL/hr (12/17/18 1220)       Medication Review:   Current Facility-Administered Medications   Medication Dose Route Frequency Provider Last Rate Last Dose   • cefTRIAXone (ROCEPHIN) IVPB 1 g/50ml dextrose (premix)  1 g Intravenous Q24H Ada Shin  mL/hr at 12/17/18 1733 1 g at 12/17/18 1733    And   • AZITHROMYCIN 500 MG/250 ML 0.9% NS IVPB (vial-mate)  500 mg Intravenous Q24H Ada Shin MD   500 mg at 12/16/18 2020   • baclofen (LIORESAL) tablet 10 mg  10 mg Oral BID Dat Grier MD   10 mg at 12/17/18 0850   • DULoxetine (CYMBALTA) DR capsule 60 mg  60 mg Oral Daily Dat Grier MD   60 mg at 12/17/18 0850   • guaifenesin-dextromethorphan (ROBITUSSIN DM) 100-10 MG/5ML syrup 5 mL  5 mL Oral Q4H PRN Ada Shin MD   5 mL at 12/16/18 2014   • ipratropium-albuterol (DUO-NEB) nebulizer solution 3 mL  3 mL Nebulization 4x Daily - RT Dat Grier MD   3 mL at 12/15/18 1204   • lactobacillus acidophilus (RISAQUAD) capsule 1 capsule  1 capsule Oral Daily Dami Boswell, DO   1 capsule at 12/17/18 0850   • ondansetron ODT (ZOFRAN-ODT) disintegrating tablet 4 mg  4 mg Oral Q8H PRN Dat Grier MD   4 mg at 12/14/18 2126   • sodium chloride 0.9 % infusion  125 mL/hr Intravenous Continuous Dat Grier  mL/hr at 12/17/18 1220 125  mL/hr at 12/17/18 1220       Assessment/Plan   1. Community acquired left lower lobe pneumonia, prior to admission     2. Acute renal failure due to dehydration/poor PO intake, improving, on IVF 125cc/hr.  Mild acidosis      3. H/o hematuria. Repeat UA resolved hematuria.     4. Visual hallucinations, possible acute delirium vs encephalopathy acute metabolic,resolved     5.   Recently passed left small ureteral stone, per CT scan, asymptomatic     Plan  - dec IVF rate 75cc/hr          Community acquired pneumonia of left lower lobe of lung (CMS/HCC)    RA (rheumatoid arthritis) (CMS/HCC)    GERD (gastroesophageal reflux disease)    Acute kidney injury (CMS/HCC)    Dehydration              Jake Bal MD  12/17/18  6:34 PM

## 2018-12-17 NOTE — PLAN OF CARE
Problem: Patient Care Overview  Goal: Plan of Care Review  Outcome: Ongoing (interventions implemented as appropriate)   12/17/18 1058   Coping/Psychosocial   Plan of Care Reviewed With patient   Plan of Care Review   Progress improving       Problem: Pneumonia (Adult)  Goal: Signs and Symptoms of Listed Potential Problems Will be Absent, Minimized or Managed (Pneumonia)  Outcome: Ongoing (interventions implemented as appropriate)      Problem: Renal Failure/Kidney Injury, Acute (Adult)  Goal: Signs and Symptoms of Listed Potential Problems Will be Absent, Minimized or Managed (Renal Failure/Kidney Injury, Acute)  Outcome: Ongoing (interventions implemented as appropriate)      Problem: Fluid Volume Deficit (Adult)  Goal: Optimal Fluid Balance  Outcome: Ongoing (interventions implemented as appropriate)

## 2018-12-18 VITALS
HEIGHT: 71 IN | SYSTOLIC BLOOD PRESSURE: 142 MMHG | DIASTOLIC BLOOD PRESSURE: 76 MMHG | RESPIRATION RATE: 18 BRPM | BODY MASS INDEX: 26.21 KG/M2 | OXYGEN SATURATION: 94 % | TEMPERATURE: 97.3 F | HEART RATE: 57 BPM | WEIGHT: 187.2 LBS

## 2018-12-18 DIAGNOSIS — N17.9 ACUTE KIDNEY INJURY (HCC): Primary | ICD-10-CM

## 2018-12-18 LAB
ALBUMIN SERPL-MCNC: 2.6 G/DL (ref 2.9–4.4)
ALBUMIN/GLOB SERPL: 0.9 {RATIO} (ref 0.7–1.7)
ALPHA1 GLOB FLD ELPH-MCNC: 0.5 G/DL (ref 0–0.4)
ALPHA2 GLOB SERPL ELPH-MCNC: 1.1 G/DL (ref 0.4–1)
ANION GAP SERPL CALCULATED.3IONS-SCNC: 12.7 MMOL/L (ref 10–20)
B-GLOBULIN SERPL ELPH-MCNC: 0.7 G/DL (ref 0.7–1.3)
BASOPHILS # BLD AUTO: 0.04 10*3/MM3 (ref 0–0.2)
BASOPHILS NFR BLD AUTO: 0.5 % (ref 0–2.5)
BUN BLD-MCNC: 28 MG/DL (ref 7–20)
BUN/CREAT SERPL: 12.2 (ref 6.3–21.9)
C-ANCA TITR SER IF: NORMAL TITER
CALCIUM SPEC-SCNC: 9.1 MG/DL (ref 8.4–10.2)
CHLORIDE SERPL-SCNC: 117 MMOL/L (ref 98–107)
CO2 SERPL-SCNC: 18 MMOL/L (ref 26–30)
CREAT BLD-MCNC: 2.3 MG/DL (ref 0.6–1.3)
DEPRECATED RDW RBC AUTO: 46.2 FL (ref 37–54)
EOSINOPHIL # BLD AUTO: 0.11 10*3/MM3 (ref 0–0.7)
EOSINOPHIL NFR BLD AUTO: 1.3 % (ref 0–7)
ERYTHROCYTE [DISTWIDTH] IN BLOOD BY AUTOMATED COUNT: 13.2 % (ref 11.5–14.5)
GAMMA GLOB SERPL ELPH-MCNC: 0.6 G/DL (ref 0.4–1.8)
GBM IGG SER-ACNC: 3 UNITS (ref 0–20)
GFR SERPL CREATININE-BSD FRML MDRD: 29 ML/MIN/1.73
GLOBULIN SER CALC-MCNC: 2.9 G/DL (ref 2.2–3.9)
GLUCOSE BLD-MCNC: 111 MG/DL (ref 74–98)
HAV IGM SERPL QL IA: NEGATIVE
HBV CORE IGM SERPL QL IA: NEGATIVE
HBV SURFACE AG SERPL QL IA: NEGATIVE
HCT VFR BLD AUTO: 28.1 % (ref 42–52)
HCV AB S/CO SERPL IA: <0.1 S/CO RATIO (ref 0–0.9)
HGB BLD-MCNC: 9.5 G/DL (ref 14–18)
IMM GRANULOCYTES # BLD: 0.09 10*3/MM3 (ref 0–0.06)
IMM GRANULOCYTES NFR BLD: 1.1 % (ref 0–0.6)
LYMPHOCYTES # BLD AUTO: 1.25 10*3/MM3 (ref 0.6–3.4)
LYMPHOCYTES NFR BLD AUTO: 14.9 % (ref 10–50)
Lab: ABNORMAL
M-SPIKE: ABNORMAL G/DL
MCH RBC QN AUTO: 32.2 PG (ref 27–31)
MCHC RBC AUTO-ENTMCNC: 33.8 G/DL (ref 30–37)
MCV RBC AUTO: 95.3 FL (ref 80–94)
MONOCYTES # BLD AUTO: 0.67 10*3/MM3 (ref 0–0.9)
MONOCYTES NFR BLD AUTO: 8 % (ref 0–12)
MYELOPEROXIDASE AB SER-ACNC: <9 U/ML (ref 0–9)
NEUTROPHILS # BLD AUTO: 6.23 10*3/MM3 (ref 2–6.9)
NEUTROPHILS NFR BLD AUTO: 74.2 % (ref 37–80)
NRBC BLD MANUAL-RTO: 0 /100 WBC (ref 0–0)
P-ANCA ATYPICAL TITR SER IF: NORMAL TITER
P-ANCA TITR SER IF: NORMAL TITER
PLATELET # BLD AUTO: 336 10*3/MM3 (ref 130–400)
PMV BLD AUTO: 9.3 FL (ref 6–12)
POTASSIUM BLD-SCNC: 3.7 MMOL/L (ref 3.5–5.1)
PROT PATTERN SERPL ELPH-IMP: ABNORMAL
PROT SERPL-MCNC: 5.5 G/DL (ref 6–8.5)
PROTEINASE3 AB SER IA-ACNC: <3.5 U/ML (ref 0–3.5)
RBC # BLD AUTO: 2.95 10*6/MM3 (ref 4.7–6.1)
SODIUM BLD-SCNC: 144 MMOL/L (ref 137–145)
WBC NRBC COR # BLD: 8.39 10*3/MM3 (ref 4.8–10.8)

## 2018-12-18 PROCEDURE — 99239 HOSP IP/OBS DSCHRG MGMT >30: CPT | Performed by: FAMILY MEDICINE

## 2018-12-18 PROCEDURE — 85025 COMPLETE CBC W/AUTO DIFF WBC: CPT | Performed by: FAMILY MEDICINE

## 2018-12-18 PROCEDURE — 80048 BASIC METABOLIC PNL TOTAL CA: CPT | Performed by: FAMILY MEDICINE

## 2018-12-18 PROCEDURE — 86431 RHEUMATOID FACTOR QUANT: CPT | Performed by: FAMILY MEDICINE

## 2018-12-18 RX ORDER — CEFDINIR 300 MG/1
300 CAPSULE ORAL 2 TIMES DAILY
Qty: 6 CAPSULE | Refills: 0 | Status: SHIPPED | OUTPATIENT
Start: 2018-12-18 | End: 2018-12-21

## 2018-12-18 RX ORDER — IPRATROPIUM BROMIDE AND ALBUTEROL SULFATE 2.5; .5 MG/3ML; MG/3ML
3 SOLUTION RESPIRATORY (INHALATION) EVERY 6 HOURS PRN
Status: DISCONTINUED | OUTPATIENT
Start: 2018-12-18 | End: 2018-12-18 | Stop reason: HOSPADM

## 2018-12-18 RX ADMIN — BACLOFEN 10 MG: 10 TABLET ORAL at 08:41

## 2018-12-18 RX ADMIN — DULOXETINE HYDROCHLORIDE 60 MG: 30 CAPSULE, DELAYED RELEASE ORAL at 08:41

## 2018-12-18 RX ADMIN — Medication 1 CAPSULE: at 08:41

## 2018-12-18 NOTE — PLAN OF CARE
Problem: Patient Care Overview  Goal: Plan of Care Review  Outcome: Ongoing (interventions implemented as appropriate)   12/18/18 0209   Coping/Psychosocial   Plan of Care Reviewed With patient   OTHER   Outcome Summary Pt continues to receive iv fluids and antibiotics as ordered. PO fluids encouraged. Pt continues to be episodes of nausea and dry heaving. Pt hoping to return home with wife in next 1-2 days.   Plan of Care Review   Progress improving     Goal: Individualization and Mutuality  Outcome: Ongoing (interventions implemented as appropriate)    Goal: Discharge Needs Assessment  Outcome: Ongoing (interventions implemented as appropriate)      Problem: Pneumonia (Adult)  Goal: Signs and Symptoms of Listed Potential Problems Will be Absent, Minimized or Managed (Pneumonia)  Outcome: Ongoing (interventions implemented as appropriate)      Problem: Renal Failure/Kidney Injury, Acute (Adult)  Goal: Signs and Symptoms of Listed Potential Problems Will be Absent, Minimized or Managed (Renal Failure/Kidney Injury, Acute)  Outcome: Ongoing (interventions implemented as appropriate)      Problem: Fluid Volume Deficit (Adult)  Goal: Identify Related Risk Factors and Signs and Symptoms  Outcome: Outcome(s) achieved Date Met: 12/18/18    Goal: Optimal Fluid Balance  Outcome: Ongoing (interventions implemented as appropriate)

## 2018-12-18 NOTE — PROGRESS NOTES
Case Management Discharge Note         Destination      No service has been selected for the patient.      Durable Medical Equipment      No service has been selected for the patient.      Dialysis/Infusion      No service has been selected for the patient.      Home Medical Care      No service has been selected for the patient.      Community Resources      No service has been selected for the patient.        Other: Other(Private vehicle)    Final Discharge Disposition Code: 01 - home or self-care

## 2018-12-18 NOTE — PAYOR COMM NOTE
"TO:[employer]  FROM:RAINA GAMEZ, RN PHONE [phone]  UPDATED CLINICALS CASE# [id number]    Emelyn, Toribio (58 y.o. Male)     Date of Birth Social Security Number Address Home Phone MRN    1960  [address] [address] [phone] [id number]    Rastafari Marital Status          Catholic        Admission Date Admission Type Admitting Provider Attending Provider Department, Room/Bed    12/14/18 Emergency Dat Grier MD Gandee, Julie G, DO [facility] MED SURG  3, 319/1    Discharge Date Discharge Disposition Discharge Destination                       Attending Provider:  Jazzy Arellano DO    Allergies:  No Known Allergies    Isolation:  None   Infection:  None   Code Status:  CPR    Ht:  180.3 cm (71\")   Wt:  84.9 kg (187 lb 3.2 oz)    Admission Cmt:  None   Principal Problem:  Community acquired pneumonia of left lower lobe of lung (CMS/HCC) [J18.1]                 Active Insurance as of 12/14/2018     Primary Coverage     Payor Plan Insurance Group Employer/Plan Group    [employer] [id number]     Payor Plan Address Payor Plan Phone Number Payor Plan Fax Number Effective Dates    [address]   1/1/2012 - None Entered    [facility] [id number]       Subscriber Name Subscriber Birth Date Member ID       BUFFY WEBB [birth year]1964 [id number]                 Emergency Contacts      (Rel.) Home Phone Work Phone Mobile Phone    Buffy Webb (Spouse) [phone] -- --            ICU Vital Signs     Row Name 12/18/18 0845 12/18/18 0830 12/18/18 0500 12/18/18 0400 12/18/18 0303       Height and Weight    Weight  --  --  84.9 kg (187 lb 3.2 oz)  --  --    Weight Method  --  --  Bed scale  --  --       Vitals    Temp  97.3 °F (36.3 °C)  --  --  --  98.3 °F (36.8 °C)    Temp src  Axillary  --  --  --  Oral    Pulse  --  --  --  --  57    Heart Rate Source  --  --  --  --  Monitor    Resp  18  --  --  --  16    Resp Rate Source  Visual  --  --  " "--  Visual    BP  142/76  --  --  --  145/83    Noninvasive MAP (mmHg)  101  --  --  --  --    BP Location  Left arm  --  --  --  Left arm    BP Method  Automatic  --  --  --  Automatic    Patient Position  Sitting  --  --  --  Lying       Oxygen Therapy    SpO2  --  --  --  --  94 %    Pulse Oximetry Type  --  --  --  --  Continuous    Device (Oxygen Therapy)  --  room air  --  room air  room air    Row Name 12/18/18 0000 12/17/18 2258 12/17/18 2003 12/17/18 2000 12/17/18 1600       Vitals    Temp  --  98.6 °F (37 °C)  98.8 °F (37.1 °C)  --  --    Temp src  --  Oral  Oral  --  --    Pulse  --  72  64  --  --    Heart Rate Source  --  Monitor  Monitor  --  --    Resp  --  16  16  --  --    Resp Rate Source  --  Visual  Visual  --  --    BP  --  --  145/71  --  --    BP Location  --  Left arm  Left arm  --  --    BP Method  --  Automatic  Automatic  --  --    Patient Position  --  Lying  Lying  --  --       Oxygen Therapy    SpO2  --  96 %  96 %  --  --    Pulse Oximetry Type  --  Continuous  Continuous  --  --    Device (Oxygen Therapy)  room air  room air  room air  room air  room air    Row Name 12/17/18 1546 12/17/18 1237                Vitals    Temp  97.9 °F (36.6 °C)  98.4 °F (36.9 °C)       Temp src  Oral  Oral       Pulse  61  77       Heart Rate Source  Monitor  --       Resp  18 17       Resp Rate Source  Visual  Visual       BP  140/73  146/84       BP Location  Left arm  Left arm       BP Method  Automatic  Automatic       Patient Position  Lying  Lying          Oxygen Therapy    SpO2  97 %  95 %       Pulse Oximetry Type  Continuous  Continuous       Device (Oxygen Therapy)  room air  room air           Hospital Medications (active)       Dose Frequency Start End    AZITHROMYCIN 500 MG/250 ML 0.9% NS IVPB (vial-mate) 500 mg Every 24 Hours 12/16/2018 12/21/2018    Sig - Route: Infuse 500 mg into a venous catheter Daily. - Intravenous    Linked Group 1:  \"And\" Linked Group Details        baclofen " "(LIORESAL) tablet 10 mg 10 mg 2 Times Daily 12/14/2018     Sig - Route: Take 1 tablet by mouth 2 (Two) Times a Day. - Oral    cefTRIAXone (ROCEPHIN) IVPB 1 g/50ml dextrose (premix) 1 g Every 24 Hours 12/16/2018 12/23/2018    Sig - Route: Infuse 50 mL into a venous catheter Daily. - Intravenous    Linked Group 1:  \"And\" Linked Group Details        DULoxetine (CYMBALTA) DR capsule 60 mg 60 mg Daily 12/15/2018     Sig - Route: Take 2 capsules by mouth Daily. - Oral    guaifenesin-dextromethorphan (ROBITUSSIN DM) 100-10 MG/5ML syrup 5 mL 5 mL Every 4 Hours PRN 12/16/2018     Sig - Route: Take 5 mL by mouth Every 4 (Four) Hours As Needed for Cough. - Oral    ipratropium-albuterol (DUO-NEB) nebulizer solution 3 mL 3 mL Every 6 Hours PRN 12/18/2018     Sig - Route: Take 3 mL by nebulization Every 6 (Six) Hours As Needed for Shortness of Air (for shortness of air). - Nebulization    lactobacillus acidophilus (RISAQUAD) capsule 1 capsule 1 capsule Daily 12/17/2018     Sig - Route: Take 1 capsule by mouth Daily. - Oral    ondansetron ODT (ZOFRAN-ODT) disintegrating tablet 4 mg 4 mg Every 8 Hours PRN 12/14/2018     Sig - Route: Take 1 tablet by mouth Every 8 (Eight) Hours As Needed for Nausea or Vomiting. - Oral    ipratropium-albuterol (DUO-NEB) nebulizer solution 3 mL (Discontinued) 3 mL 4 Times Daily - RT 12/14/2018 12/18/2018    Sig - Route: Take 3 mL by nebulization 4 (Four) Times a Day. - Nebulization    sodium chloride 0.9 % infusion (Discontinued) 75 mL/hr Continuous 12/14/2018 12/18/2018    Sig - Route: Infuse 75 mL/hr into a venous catheter Continuous. - Intravenous          Lab Results (last 24 hours)     Procedure Component Value Units Date/Time    Glomerular Basement Membrane Antibodies [[id number]] Collected:  12/16/18 1233    Specimen:  Blood Updated:  12/18/18 1016     GBM Ab 3 units      Comment:                    Negative                   0 - 20                     Weak Positive             21 - 30           "           Moderate to Strong Positive   >30       Narrative:       Performed at:  [address] - [address]  [id number]  : Madhuri Bryan MD, Phone:  [phone]    Basic Metabolic Panel [[id number]]  (Abnormal) Collected:  12/18/18 0554    Specimen:  Blood Updated:  12/18/18 0643     Glucose 111 mg/dL      BUN 28 mg/dL      Creatinine 2.30 mg/dL      Sodium 144 mmol/L      Potassium 3.7 mmol/L      Chloride 117 mmol/L      CO2 18.0 mmol/L      Calcium 9.1 mg/dL      eGFR Non African Amer 29 mL/min/1.73      BUN/Creatinine Ratio 12.2     Anion Gap 12.7 mmol/L     Narrative:       GFR Normal >60  Chronic Kidney Disease <60  Kidney Failure <15    CBC & Differential [[id number]] Collected:  12/18/18 0554    Specimen:  Blood Updated:  12/18/18 0621    Narrative:       The following orders were created for panel order CBC & Differential.  Procedure                               Abnormality         Status                     ---------                               -----------         ------                     CBC Auto Differential[[id number]]        Abnormal            Final result                 Please view results for these tests on the individual orders.    CBC Auto Differential [[id number]]  (Abnormal) Collected:  12/18/18 0554    Specimen:  Blood Updated:  12/18/18 0621     WBC 8.39 10*3/mm3      RBC 2.95 10*6/mm3      Hemoglobin 9.5 g/dL      Hematocrit 28.1 %      MCV 95.3 fL      MCH 32.2 pg      MCHC 33.8 g/dL      RDW 13.2 %      RDW-SD 46.2 fl      MPV 9.3 fL      Platelets 336 10*3/mm3      Neutrophil % 74.2 %      Lymphocyte % 14.9 %      Monocyte % 8.0 %      Eosinophil % 1.3 %      Basophil % 0.5 %      Immature Grans % 1.1 %      Neutrophils, Absolute 6.23 10*3/mm3      Lymphocytes, Absolute 1.25 10*3/mm3      Monocytes, Absolute 0.67 10*3/mm3      Eosinophils, Absolute 0.11 10*3/mm3      Basophils, Absolute 0.04 10*3/mm3      Immature Grans, Absolute 0.09 10*3/mm3       nRBC 0.0 /100 WBC     Hepatitis Panel, Acute [[id number]] Collected:  12/16/18 1233    Specimen:  Blood Updated:  12/18/18 0612     Hep A IgM Negative     Hepatitis B Surface Ag Negative     Hep B Core IgM Negative     Hep C Virus Ab <0.1 s/co ratio      Comment:                                   Negative:     < 0.8                               Indeterminate: 0.8 - 0.9                                    Positive:     > 0.9   The CDC recommends that a positive HCV antibody result   be followed up with a HCV Nucleic Acid Amplification   test (313369).       Narrative:       Performed at:  [address]  [id number]  : Klever Reyez PhD, Phone:  [phone]    Blood Culture - Blood, Hand, Right [[id number]] Collected:  12/14/18 1834    Specimen:  Blood from Hand, Right Updated:  12/17/18 1900     Blood Culture No growth at 3 days    Blood Culture - Blood, Hand, Left [[id number]] Collected:  12/14/18 1832    Specimen:  Blood from Hand, Left Updated:  12/17/18 1846     Blood Culture No growth at 3 days    C4+C3 [[id number]] Collected:  12/16/18 1233    Specimen:  Blood Updated:  12/17/18 1515     C3 Complement 158 mg/dL      C4 Complement 22 mg/dL     Narrative:       Performed at:  [address]  [id number]  : Klever Reyez PhD, Phone:  [phone]    Antinuclear Antibody With Reflex Cascade [[id number]] Collected:  12/16/18 1233    Specimen:  Blood Updated:  12/17/18 1309     See below: Comment     Comment: Autoantibody                       Disease Association  ____________________________________________________________                          Condition                  Frequency  _____________________   ________________________   _________  Antinuclear Antibody,    SLE, mixed connective  Direct (NAKIA-D)           tissue diseases  _____________________   ________________________   _________  dsDNA                    SLE                         40 - 60%  _____________________   ________________________   _________  Chromatin                Drug induced SLE                90%                           SLE                        48 - 97%  _____________________   ________________________   _________  SSA (Ro)                 SLE                        25 - 35%                           Sjogren's Syndrome         40 - 70%                            Lupus                 100%  _____________________   ________________________   _________  SSB (La)                 SLE                             10%                           Sjogren's Syndrome              30%  _____________________   _______________________    _________  Sm (anti-Smith)          SLE                        15 - 30%  _____________________   _______________________    _________  RNP                      Mixed Connective Tissue                           Disease                         95%  (U1 nRNP,                SLE                        30 - 50%  anti-ribonucleoprotein)  Polymyositis and/or                           Dermatomyositis                 20%  _____________________   ________________________   _________  Scl-70 (antiDNA          Scleroderma (diffuse)      20 - 35%  topoisomerase)           Crest                           13%  _____________________   ________________________   _________  Liliana-1                     Polymyositis and/or                           Dermatomyositis            20 - 40%  _____________________   ________________________   _________  Centromere B             Scleroderma - Crest                           variant                         80%  _____________________   ________________________   _________  Ribosomal P              SLE                        10 - 20%        NAKIA Direct Negative     Comment: **Effective 2019, test 336423 Antinuclear Ab Reflex**    Garland will be made nonorderable. There is not a direct replacement     "test. [facility] offers several NAKIA test options. Please refer to the    [facility] Directory of Services.       Narrative:       Performed at:  [address] - [address]  [id number]  : Klever Reyez PhD, Phone:  [phone]    PTH, Intact & Calcium [[id number]] Collected:  12/16/18 1233    Specimen:  Blood Updated:  12/17/18 1309     Calcium 9.3 mg/dL      PTH, Intact 31 pg/mL      PTH, Intact Comment     Comment: Interpretation                 Intact PTH    Calcium                                  (pg/mL)      (mg/dL)  Normal                          15 - 65     8.6 - 10.2  Primary Hyperparathyroidism         >65          >10.2  Secondary Hyperparathyroidism       >65          <10.2  Non-Parathyroid Hypercalcemia       <65          >10.2  Hypoparathyroidism                  <15          < 8.6  Non-Parathyroid Hypocalcemia    15 - 65          < 8.6       Narrative:       Performed at:  [address] - [address]  [id number]  : Klever Reyez PhD, Phone:  [phone]           Physician Progress Notes (last 72 hours) (Notes from 12/15/2018 12:16 PM through 12/18/2018 12:16 PM)      Aron Hinds MD at 12/18/2018  7:47 AM             LOS: 3 days    Patient Care Team:  Talya Venegas MD as PCP - General (Internal Medicine)    Chief Complaint:    Chief Complaint   Patient presents with   • Cough   • Nausea   • Nasal Congestion   • poor appetite     Follow UP NAZ   Subjective     Interval History:   Patient is having recurrent cough, feeling better, wants to go home, he denies any chest pain, no shortness of air, no nausea or vomiting, no abdominal pain, no dysuria or gross hematuria.    Objective     Vital Signs  Temp:  [97.8 °F (36.6 °C)-98.8 °F (37.1 °C)] 98.3 °F (36.8 °C)  Heart Rate:  [57-80] 57  Resp:  [16-18] 16  BP: (140-146)/(71-84) 145/83    Flowsheet Rows      First Filed Value   Admission Height  180.3 cm (71\") Documented at " 12/14/2018 1650   Admission Weight  81.5 kg (179 lb 9.6 oz) Documented at 12/14/2018 1650          No intake/output data recorded.  I/O last 3 completed shifts:  In: 590 [P.O.:340; IV Piggyback:250]  Out: 1450 [Urine:1450]    Intake/Output Summary (Last 24 hours) at 12/18/2018 0747  Last data filed at 12/17/2018 2330  Gross per 24 hour   Intake 590 ml   Output 1000 ml   Net -410 ml       Physical Exam:  General Appearance: alert, oriented x 3, no acute distress,   Skin: warm and dry  HEENT: Nonicteric sclerae, oral mucosa normal,   Neck: supple, no JVD, trachea midline  Lungs: Bilateral rhonchi, unlabored breathing effort  Heart: RRR, normal S1 and S2, no S3, no rub  Abdomen: soft, non-tender,  present bowel sounds to auscultation  : no palpable bladder,  Extremities: no edema, cyanosis or clubbing  Neuro: normal speech and mental status       Results Review:    Results from last 7 days   Lab Units  12/18/18   0554  12/17/18   0551  12/16/18   1233  12/16/18   0537  12/15/18   0545  12/14/18   1737   SODIUM mmol/L  144  145   --   142  141  138   POTASSIUM mmol/L  3.7  3.9   --   3.7  3.8  4.1   CHLORIDE mmol/L  117*  116*   --   112*  107  99   CO2 mmol/L  18.0*  21.0*   --   23.0*  22.0*  22.0*   BUN mg/dL  28*  31*   --   34*  42*  46*   CREATININE mg/dL  2.30*  2.60*   --   2.70*  2.90*  3.20*   CALCIUM mg/dL  9.1  9.1  9.3  9.2  8.8  9.9   BILIRUBIN mg/dL   --    --    --    --   0.9  1.3   ALK PHOS U/L   --    --    --    --   97  123   ALT (SGPT) U/L   --    --    --    --   43  38   AST (SGOT) U/L   --    --    --    --   39  46   GLUCOSE mg/dL  111*  109*   --   108*  102*  115*       Estimated Creatinine Clearance: 42 mL/min (A) (by C-G formula based on SCr of 2.3 mg/dL (H)).    Results from last 7 days   Lab Units  12/16/18   1233   PHOSPHORUS mg/dL  5.0*             Results from last 7 days   Lab Units  12/18/18   0554  12/16/18   0537  12/15/18   0545  12/14/18   1737   WBC 10*3/mm3  8.39  8.77   16.49*  21.01*   HEMOGLOBIN g/dL  9.5*  9.9*  9.9*  12.0*   PLATELETS 10*3/mm3  336  306  286  343               Imaging Results (last 24 hours)     ** No results found for the last 24 hours. **          ceftriaxone 1 g Intravenous Q24H   And      azithromycin 500 mg Intravenous Q24H   baclofen 10 mg Oral BID   DULoxetine 60 mg Oral Daily   ipratropium-albuterol 3 mL Nebulization 4x Daily - RT   lactobacillus acidophilus 1 capsule Oral Daily       Sodium chloride 75 mL/hr Last Rate: 75 mL/hr (12/17/18 1922)       Medication Review:   Current Facility-Administered Medications   Medication Dose Route Frequency Provider Last Rate Last Dose   • cefTRIAXone (ROCEPHIN) IVPB 1 g/50ml dextrose (premix)  1 g Intravenous Q24H Ada Shin  mL/hr at 12/17/18 1733 1 g at 12/17/18 1733    And   • AZITHROMYCIN 500 MG/250 ML 0.9% NS IVPB (vial-mate)  500 mg Intravenous Q24H Ada Shin MD   500 mg at 12/17/18 2121   • baclofen (LIORESAL) tablet 10 mg  10 mg Oral BID Dat Grier MD   10 mg at 12/17/18 2122   • DULoxetine (CYMBALTA) DR capsule 60 mg  60 mg Oral Daily Dat Grier MD   60 mg at 12/17/18 0850   • guaifenesin-dextromethorphan (ROBITUSSIN DM) 100-10 MG/5ML syrup 5 mL  5 mL Oral Q4H PRN Ada Shin MD   5 mL at 12/16/18 2014   • ipratropium-albuterol (DUO-NEB) nebulizer solution 3 mL  3 mL Nebulization 4x Daily - RT Dat Grier MD   3 mL at 12/15/18 1204   • lactobacillus acidophilus (RISAQUAD) capsule 1 capsule  1 capsule Oral Daily Dami Boswell DO   1 capsule at 12/17/18 0850   • ondansetron ODT (ZOFRAN-ODT) disintegrating tablet 4 mg  4 mg Oral Q8H PRN Dat Grier MD   4 mg at 12/14/18 2126   • sodium chloride 0.9 % infusion  75 mL/hr Intravenous Continuous Jake Bal MD 75 mL/hr at 12/17/18 1922 75 mL/hr at 12/17/18 1922       Assessment/Plan    1.  Acute kidney injury associated with volume depletion related to poor oral intake, improving  "slowly, creatinine down to 2.3, he is currently on IV fluid, normal saline at 75 cc per  2.  History of hematuria, repeat urinalysis showed that the hematuria has resolved  3.  Recently passed left small ureteral stone based on CT scan and patient is asymptomatic  4.  Pneumonia  5.  Hallucination and delirium has resolved.      Plan:  1.  Discontinue the IV fluid  2.  Surveillance labs          Aron Hinds MD  12/18/18  7:47 AM      Electronically signed by Aron Hinds MD at 12/18/2018  7:50 AM     Jake Bal MD at 12/17/2018  6:34 PM             LOS: 2 days    Patient Care Team:  Talya Venegas MD as PCP - General (Internal Medicine)    Chief Complaint:    Chief Complaint   Patient presents with   • Cough   • Nausea   • Nasal Congestion   • poor appetite     Follow UP NAZ   Subjective     Interval History:   Decent PO intake described.  No soa or n/v    Objective     Vital Signs  Temp:  [97.8 °F (36.6 °C)-99 °F (37.2 °C)] 97.9 °F (36.6 °C)  Heart Rate:  [61-95] 61  Resp:  [17-18] 18  BP: (134-146)/(67-84) 140/73    Flowsheet Rows      First Filed Value   Admission Height  180.3 cm (71\") Documented at 12/14/2018 1650   Admission Weight  81.5 kg (179 lb 9.6 oz) Documented at 12/14/2018 1650          I/O this shift:  In: 340 [P.O.:340]  Out: 500 [Urine:500]  I/O last 3 completed shifts:  In: 1410 [P.O.:290; I.V.:1120]  Out: 1650 [Urine:1650]    Intake/Output Summary (Last 24 hours) at 12/17/2018 1834  Last data filed at 12/17/2018 1237  Gross per 24 hour   Intake 1460 ml   Output 950 ml   Net 510 ml       Physical Exam:  gen nad, alert  Neck supple no JVD  Lungs CTA bilat no rales  CV RRR no m/g  Abd soft NT/ND  vasc no pedal edema 2+ radial pulses     Results Review:    Results from last 7 days   Lab Units  12/17/18   0551  12/16/18   1233  12/16/18   0537  12/15/18   0545  12/14/18   1737   SODIUM mmol/L  145   --   142  141  138   POTASSIUM mmol/L  3.9   --   3.7  3.8  4.1 "   CHLORIDE mmol/L  116*   --   112*  107  99   CO2 mmol/L  21.0*   --   23.0*  22.0*  22.0*   BUN mg/dL  31*   --   34*  42*  46*   CREATININE mg/dL  2.60*   --   2.70*  2.90*  3.20*   CALCIUM mg/dL  9.1  9.3  9.2  8.8  9.9   BILIRUBIN mg/dL   --    --    --   0.9  1.3   ALK PHOS U/L   --    --    --   97  123   ALT (SGPT) U/L   --    --    --   43  38   AST (SGOT) U/L   --    --    --   39  46   GLUCOSE mg/dL  109*   --   108*  102*  115*       Estimated Creatinine Clearance: 37.3 mL/min (A) (by C-G formula based on SCr of 2.6 mg/dL (H)).    Results from last 7 days   Lab Units  12/16/18   1233   PHOSPHORUS mg/dL  5.0*             Results from last 7 days   Lab Units  12/16/18   0537  12/15/18   0545  12/14/18   1737   WBC 10*3/mm3  8.77  16.49*  21.01*   HEMOGLOBIN g/dL  9.9*  9.9*  12.0*   PLATELETS 10*3/mm3  306  286  343               Imaging Results (last 24 hours)     ** No results found for the last 24 hours. **          ceftriaxone 1 g Intravenous Q24H   And      azithromycin 500 mg Intravenous Q24H   baclofen 10 mg Oral BID   DULoxetine 60 mg Oral Daily   ipratropium-albuterol 3 mL Nebulization 4x Daily - RT   lactobacillus acidophilus 1 capsule Oral Daily       sodium chloride 125 mL/hr Last Rate: 125 mL/hr (12/17/18 1220)       Medication Review:   Current Facility-Administered Medications   Medication Dose Route Frequency Provider Last Rate Last Dose   • cefTRIAXone (ROCEPHIN) IVPB 1 g/50ml dextrose (premix)  1 g Intravenous Q24H Ada Shin  mL/hr at 12/17/18 1733 1 g at 12/17/18 1733    And   • AZITHROMYCIN 500 MG/250 ML 0.9% NS IVPB (vial-mate)  500 mg Intravenous Q24H Ada Shin MD   500 mg at 12/16/18 2020   • baclofen (LIORESAL) tablet 10 mg  10 mg Oral BID Dat Grier MD   10 mg at 12/17/18 0850   • DULoxetine (CYMBALTA) DR capsule 60 mg  60 mg Oral Daily Dat Grier MD   60 mg at 12/17/18 0850   • guaifenesin-dextromethorphan (ROBITUSSIN DM) 100-10 MG/5ML syrup  5 mL  5 mL Oral Q4H PRN Ada Shin MD   5 mL at 18   • ipratropium-albuterol (DUO-NEB) nebulizer solution 3 mL  3 mL Nebulization 4x Daily - RT Dat Grier MD   3 mL at 12/15/18 1204   • lactobacillus acidophilus (RISAQUAD) capsule 1 capsule  1 capsule Oral Daily Dami Boswell DO   1 capsule at 18 0850   • ondansetron ODT (ZOFRAN-ODT) disintegrating tablet 4 mg  4 mg Oral Q8H PRN Dat Grier MD   4 mg at 18 2126   • sodium chloride 0.9 % infusion  125 mL/hr Intravenous Continuous Dat Grier  mL/hr at 18 1220 125 mL/hr at 18 1220       Assessment/Plan   1. Community acquired left lower lobe pneumonia, prior to admission     2. Acute renal failure due to dehydration/poor PO intake, improving, on IVF 125cc/hr.  Mild acidosis      3. H/o hematuria. Repeat UA resolved hematuria.     4. Visual hallucinations, possible acute delirium vs encephalopathy acute metabolic,resolved     5.   Recently passed left small ureteral stone, per CT scan, asymptomatic     Plan  - dec IVF rate 75cc/hr          Community acquired pneumonia of left lower lobe of lung (CMS/HCC)    RA (rheumatoid arthritis) (CMS/HCC)    GERD (gastroesophageal reflux disease)    Acute kidney injury (CMS/HCC)    Dehydration              Jake Bal MD  18  6:34 PM      Electronically signed by Jake Bal MD at 2018  6:53 PM     Jazzy Arellano DO at 2018  4:04 PM                [facility]    [facility]    Name:  Toribio Dunaway   Age:  58 y.o.  Sex:  male  :  1960  MRN:  [id number]   Visit Number:  [id number]  Admission Date:  2018  Date Of Service:  18  Primary Care Physician:  Talya Venegas MD     LOS: 2 days :      Chief Complaint:  Follow up pneumonia.        Subjective / Interval History: The patient was admitted for pneumonia and acute kidney injury.  He has been hydrated and  improving slowly.  He did have some confusion earlier in his stay which has since resolved.  He does still have flat affect but denies any mood changes.  He has had a very poor appetite and has hardly been eating or drinking anything yet.  No acute events occurred overnight.  He has improved cough and congestion.  He had some edema and his abdomen improved today after refusing further IV fluids.  He denies shortness of breath or edema.  He has not been ambulating and his wife reports that she will help him walk in the hallway today.      Review of Systems:     General ROS: Patient denies any fevers, chills or loss of consciousness.  Ophthalmic ROS: Denies any diplopia or transient loss of vision.  ENT ROS: Denies sore throat, nasal congestion or ear pain.   Respiratory ROS: Improved cough and shortness of breath.  Cardiovascular ROS: Denies chest pain or palpitations. No history of exertional chest pain.   Gastrointestinal ROS: Denies nausea and vomiting. Denies any abdominal pain. No diarrhea.  Genito-Urinary ROS: Denies dysuria or hematuria.  Musculoskeletal ROS: Denies chronic back pain. No muscle pain. No calf pain.  Neurological ROS: Denies any focal weakness. No loss of consciousness. Denies any numbness. Denies neck pain.   Dermatological ROS: Denies any redness or pruritis.    Vital Signs:    Temp:  [97.8 °F (36.6 °C)-99 °F (37.2 °C)] 97.9 °F (36.6 °C)  Heart Rate:  [61-95] 61  Resp:  [17-18] 18  BP: (134-146)/(67-84) 140/73    Intake and output:    I/O last 3 completed shifts:  In: 1410 [P.O.:290; I.V.:1120]  Out: 1650 [Urine:1650]  I/O this shift:  In: 340 [P.O.:340]  Out: 500 [Urine:500]    Physical Examination:    General Appearance:    Alert and cooperative, not in any acute distress.Flat affect   Head:    Atraumatic and normocephalic, without obvious abnormality.   Eyes:            PERRLA, conjunctivae and sclerae normal, no Icterus. No pallor. Extraocular movements are within normal limits.   Throat:    No oral lesions, no thrush, oral mucosa moist.   Neck:   Supple, trachea midline, no thyromegaly, no carotid bruit.   Lungs:     Chest shape is normal. Breath sounds heard bilaterally equally.  Left lower lobe trace crackles no wheezing. No Pleural rub or bronchial breathing.    Heart:    Normal S1 and S2, no murmur, no gallop   Abdomen:     Normal bowel sounds, no masses, no organomegaly. Soft        non-tender, non-distended, no guarding, no rebound                tenderness   Extremities:   Moves all extremities well, no edema, no cyanosis, no             clubbing   Skin:   No bleeding, bruising or rash.   Neurologic:   Cranial nerves 2 - 12 grossly intact, sensation intact, Motor power is normal and equal bilaterally.   Laboratory results:    Lab Results (last 24 hours)     Procedure Component Value Units Date/Time    C4+C3 [[id number]] Collected:  12/16/18 1233    Specimen:  Blood Updated:  12/17/18 3277     C3 Complement 158 mg/dL      C4 Complement 22 mg/dL     Narrative:       Performed at:  [address] [place] [address]  [id number]  : Klever Reyez PhD, Phone:  [phone]    Antinuclear Antibody With Reflex Cascade [[id number]] Collected:  12/16/18 1233    Specimen:  Blood Updated:  12/17/18 8255     See below: Comment     Comment: Autoantibody                       Disease Association  ____________________________________________________________                          Condition                  Frequency  _____________________   ________________________   _________  Antinuclear Antibody,    SLE, mixed connective  Direct (NAKIA-D)           tissue diseases  _____________________   ________________________   _________  dsDNA                    SLE                        40 - 60%  _____________________   ________________________   _________  [place]                Drug induced SLE                90%                           SLE                        48 -  97%  _____________________   ________________________   _________  SSA (Ro)                 SLE                        25 - 35%                           Sjogren's Syndrome         40 - 70%                            Lupus                 100%  _____________________   ________________________   _________  SSB (La)                 SLE                             10%                           Sjogren's Syndrome              30%  _____________________   _______________________    _________  Sm (anti-Smith)          SLE                        15 - 30%  _____________________   _______________________    _________  RNP                      Mixed Connective Tissue                           Disease                         95%  (U1 nRNP,                SLE                        30 - 50%  anti-ribonucleoprotein)  Polymyositis and/or                           Dermatomyositis                 20%  _____________________   ________________________   _________  Scl-70 (antiDNA          Scleroderma (diffuse)      20 - 35%  topoisomerase)           Crest                           13%  _____________________   ________________________   _________  Liliana-1                     Polymyositis and/or                           Dermatomyositis            20 - 40%  _____________________   ________________________   _________  Centromere B             Scleroderma - Crest                           variant                         80%  _____________________   ________________________   _________  Ribosomal P              SLE                        10 - 20%        NAKIA Direct Negative     Comment: **Effective 2019, test [id number] Antinuclear Ab Reflex**    [place] will be made nonorderable. There is not a direct replacement    test. [facility] offers several NAKIA test options. Please refer to the    [facility] Directory of Services.       Narrative:       Performed at:   [address]  [id number]  Lab  Director: Klever Reyez PhD, Phone:  [phone]    PTH, Intact & Calcium [[id number]] Collected:  12/16/18 1233    Specimen:  Blood Updated:  12/17/18 1309     Calcium 9.3 mg/dL      PTH, Intact 31 pg/mL      PTH, Intact Comment     Comment: Interpretation                 Intact PTH    Calcium                                  (pg/mL)      (mg/dL)  Normal                          15 - 65     8.6 - 10.2  Primary Hyperparathyroidism         >65          >10.2  Secondary Hyperparathyroidism       >65          <10.2  Non-Parathyroid Hypercalcemia       <65          >10.2  Hypoparathyroidism                  <15          < 8.6  Non-Parathyroid Hypocalcemia    15 - 65          < 8.6       Narrative:       Performed at:  [address] [place] [address]  [id number]  : Klever Reyez PhD, Phone:  [phone]    Basic Metabolic Panel [[id number]]  (Abnormal) Collected:  12/17/18 0551    Specimen:  Blood Updated:  12/17/18 0632     Glucose 109 mg/dL      BUN 31 mg/dL      Creatinine 2.60 mg/dL      Sodium 145 mmol/L      Potassium 3.9 mmol/L      Chloride 116 mmol/L      CO2 21.0 mmol/L      Calcium 9.1 mg/dL      eGFR Non African Amer 25 mL/min/1.73      BUN/Creatinine Ratio 11.9     Anion Gap 11.9 mmol/L     Narrative:       GFR Normal >60  Chronic Kidney Disease <60  Kidney Failure <15    Blood Culture - Blood, Hand, Right [[id number]] Collected:  12/14/18 1834    Specimen:  Blood from Hand, Right Updated:  12/16/18 1901     Blood Culture No growth at 2 days    Blood Culture - Blood, Hand, Left [[id number]] Collected:  12/14/18 1832    Specimen:  Blood from Hand, Left Updated:  12/16/18 1846     Blood Culture No growth at 2 days          I have reviewed the patient's laboratory results.    Radiology results:    Imaging Results (last 24 hours)     ** No results found for the last 24 hours. **          I have reviewed the patient's radiology reports.    Medication Review:     I have reviewed the  "patients active and prn medications.     Assessment:    1. Community acquired left lower lobe pneumonia, prior to admission     2. Acute renal failure due to dehydration/poor PO intake, improved     3. H/o hematuria. Repeat UA  resolved hematuria.     4. Visual hallucinations, possible acute delirium vs encephalopathy acute metabolic,resolved    5. Recently passed left small ureteral stone, per CT scan, asymptomatic    6. Dehydration, improved          Plan:  Continue current antibiotics, oxygen if needed and nebs. He is still not eating or drinking well and severely weak. Anticipate further improvement and discharge home in the next 1-2 days.   WBC improved. Creatinine improved. Patient refused further iv fluids today, feeling swollen in abdomen.     Ambulate today.     Medication risks and benefits were discussed in detail. Patient and wife reported satisfaction with care delivered and treatment plan.     Jazzy Arellano DO  18  4:04 PM            Electronically signed by Jazzy Arellano DO at 2018  4:09 PM     Ada Shin MD at 2018  9:50 AM          [facility]    Name: Toribio Dunaway, 58 y.o. male  MRN: [id number], : 1960   Date of Admission: 2018   Date of Service: 18   PCP: Tomas Almodovar DO     Hospital Course:   Mr. Dunaway is a 57 yo M with h/o GERD, RA, renal stones who was admitted on 18 for community acquired pneumonia (fever, cough with blood tinged mucus, poor appetite x 6 days). Vitals on admission notable for , O2 sat appropriate on RA. Labs on admission notable for WBC 21K with left shift; hgb 12; BUN/Cr 46/3.2; Glc 115; Influenza A/B: negative; BCx x 2 obtained.  Radiographs on admission notable for CXR: \"Findings suggestive of a developing consolidation in the posterior left lung base that could represent a pneumonia given the history.  Appropriate treatment and followup x-rays in 6 weeks is " "recommended to ensure proper resolution.\". Patient was given rocephin, azithromycin, baclofen, duoneb, NS 3L bolus, tylenol in ER. Creatinine is slowly improving. Not taking much PO. Continued on IV fluids. Urine studies ordered. Dr. Hoff with nephrology consulted.    Consultants: Dr. Hoff (nephrology)  Procedures: none    Antibiotics:   Azithromycin d3  Rocephin d3  Micro:   12/14 BCx: ngtd x 2  12/14 Influenza A/B: negative    -----------------------------------------------------------------------------------------------------------------------------------------------------------------------------------------------------------------------------------------------------  Subjective   Chief Complaint: f/u pneumonia     Subjective:   Patient seen and examined this morning. Events from last night noted, was seeing green spots. Discussed with MIRIAN Downs. Patient states he is not feeling well this morning and did not get much sleep last night due to seeing spots. The spots have dissipated this morning. Breathing has improved, but endorses coughing spells. Endorses urinary hesitancy on voiding, wife states this has been going on for \"a while\"    Review of Systems:   Gen-no fevers, no chills  CV-no chest pain, no palpitations  GI-no N/V/D, no abd pain    Objective   Objective:     Intake/Output Summary (Last 24 hours) at 12/16/2018 0950  Last data filed at 12/16/2018 0839  Gross per 24 hour   Intake 1440 ml   Output 1050 ml   Net 390 ml      SpO2: 96 %     Physical Examination:   Vitals:    12/16/18 0000 12/16/18 0400 12/16/18 0500 12/16/18 0748   BP: 123/67 121/70  131/72   BP Location: Left arm Left arm     Patient Position: Lying Lying     Pulse: 89 73  70   Resp: 17 16 17   Temp: 98.9 °F (37.2 °C) 98.3 °F (36.8 °C)  98.4 °F (36.9 °C)   TempSrc: Oral Oral  Oral   SpO2:    96%   Weight:   85.1 kg (187 lb 9.6 oz)    Height:          General:  Pleasant, elderly male, no acute distress; dry mucous membranes  Heart: "   RRR, S1 S2 normal, no m/r/g  Lungs:   Rhonchi at left base; no wheezes  Abdomen:  soft, NT, ND, +BS  Extremities: no edema/cyanosis/clubbing  Neuro:  A&Ox3, no focal deficits  Psych:  appropriate mood  Skin:  No bleeding, bruising, or rash    Pertinent laboratory and radiology data were reviewed:  Microbiology Results (last 10 days)     Procedure Component Value - Date/Time    Blood Culture - Blood, Hand, Right [[id number]] Collected:  12/14/18 1834    Lab Status:  Preliminary result Specimen:  Blood from Hand, Right Updated:  12/15/18 1901     Blood Culture No growth at 24 hours    Blood Culture - Blood, Hand, Left [[id number]] Collected:  12/14/18 1832    Lab Status:  Preliminary result Specimen:  Blood from Hand, Left Updated:  12/15/18 1846     Blood Culture No growth at 24 hours    Influenza Antigen, Rapid - Swab, Nasopharynx [[id number]]  (Normal) Collected:  12/14/18 1737    Lab Status:  Final result Specimen:  Swab from Nasopharynx Updated:  12/14/18 1754     Influenza A Ag, EIA Negative     Influenza B Ag, EIA Negative          Medications Reviewed:     ceftriaxone 1 g Intravenous Q24H   And      azithromycin 500 mg Intravenous Q24H   baclofen 10 mg Oral BID   DULoxetine 60 mg Oral Daily   ipratropium-albuterol 3 mL Nebulization 4x Daily - RT        Assessment /Plan   Assessment/Plan:   1. Community acquired left lower lobe pneumonia.   · Azithromycin, rocephin x 5/7 days; nebs  · Leukocytosis normalized  · Add cough syrup prn  · F/u BCx    2. Acute renal failure due to dehydration/poor PO intake  · Creatinine improving slowly. Continue IVF. Still not drinking much on his own per wife.  · Will check post-void residual with bladder scan, patient endorsed urinary hesitancy, possible BPH  · Consulted Dr. Hoff with nephrology  · Strict I/Os; labs in AM  · UA ordered yesterday, but some issue with collection/labeling, will re-order today along with urine sodium/creatinine  · Continue to encourage PO intake as  "well    3. H/o hematuria. Repeat UA pending    4. Visual hallucinations, overnight. Unclear etiology. ?delirium. Do not see any medication offenders. Has resolved this morning.     FEN: cardiac  DVT Prophylaxis: SCDs  PUD Prophylaxis: not indicated    Reason for continued hospitalization: above  Disposition: pending nephrology evaluation and urine studies  Discussed with: patient, wife and RN Yareli Shin MD   9:50 AM on 12/16/2018    Electronically signed by Ada Shin MD at 12/16/2018  9:59 AM          Consult Notes (last 72 hours) (Notes from 12/15/2018 12:16 PM through 12/18/2018 12:16 PM)      Steven Conte MD at 12/16/2018 12:05 PM      Consult Orders    1. Inpatient Nephrology Consult [[id number]] ordered by Ada Shin MD at 12/16/18 0949                  Referring Provider: Hospitalist  Reason for Consultation: renal issues    Patient Care Team:  Tomas Almodovar DO as PCP - General (Family Medicine)    Chief complaint     Subjective . Weakness/not eating    History of present illness:     57 Y/O WM with recent history of L ureteral stone admitted via wife's urging for weakness, chills, and poor intake for almost a week  He was diagnosed with PNA and has been on IVF\"s and antbx    He is unsure if November kidney stone has passed. He did have hematuria and pain last month, none today  HE is still not eating, but no N/V/D    Denies NSAID use  No FH of kidneys disease/stones    ? Dx of rheumatoid arthritis-- \"old DX, never on any specific meds\"    Review of Systems    General : No pain, ? Weight loss  HEENT: No headache, no nosebleed, no sore throat, no blurry vision  Chest : no chest pain, no palpitations, no chest wall pain  Respiratory: + cough, no SOA, no BAPTISTE,  no orthopnea  GI:  No N/V/D, no abdominal pain, no BRBPR, no melena  Skin : no rashes, no pruritus  Musculoskeletal : no flank pain, no joint effusions  Neuro : + weakness, no numbness, no dizziness, " no double vision  Endocrine: no heat/cold intolerance,  Genitourinary: no dysuria or hematuria, no frequency  Psychiatric: no insomnia, no depression, no anxiety  Heme: no easy bruising or bleeding  Vascular: no cyanosis or extremity pain        Past Medical History:   Diagnosis Date   • GERD (gastroesophageal reflux disease)    • Injury of back    • Kidney stone    • RA (rheumatoid arthritis) (CMS/[facility])      Past Surgical History:   Procedure Laterality Date   • COLONOSCOPY     • LASIK     • TESTICLE SURGERY       History reviewed. No pertinent family history.  Social History     Tobacco Use   • Smoking status: Never Smoker   • Smokeless tobacco: Never Used   Substance Use Topics   • Alcohol use: No   • Drug use: No     FH - neg for kidney disease or stones  Medications Prior to Admission   Medication Sig Dispense Refill Last Dose   • baclofen (LIORESAL) 10 MG tablet Take 10 mg by mouth 2 (Two) Times a Day.   12/14/2018 at Unknown time   • celecoxib (CeleBREX) 200 MG capsule Take 200 mg by mouth Daily.   12/13/2018 at Unknown time   • DULoxetine (CYMBALTA) 60 MG capsule Take 60 mg by mouth 2 (Two) Times a Day.   12/14/2018 at Unknown time   • gabapentin (NEURONTIN) 600 MG tablet Take 600 mg by mouth 3 (Three) Times a Day.   12/14/2018 at Unknown time   • ondansetron ODT (ZOFRAN-ODT) 4 MG disintegrating tablet Take 1 tablet by mouth Every 8 (Eight) Hours As Needed for Nausea or Vomiting. 12 tablet 0 12/14/2018 at Unknown time   • TRAMADOL HCL PO Take 50 mg by mouth 3 (Three) Times a Day.   12/14/2018 at Unknown time       ceftriaxone 1 g Intravenous Q24H   And      azithromycin 500 mg Intravenous Q24H   baclofen 10 mg Oral BID   DULoxetine 60 mg Oral Daily   ipratropium-albuterol 3 mL Nebulization 4x Daily - RT       sodium chloride 125 mL/hr Last Rate: 125 mL/hr (12/16/18 0926)     Allergies:   Patient has no known allergies.    Objective     Vital Signs   Temp:  [98.3 °F (36.8 °C)-98.9 °F (37.2 °C)] 98.4 °F (36.9  °C)  Heart Rate:  [70-89] 70  Resp:  [16-18] 17  BP: (120-131)/(62-72) 131/72      Intake/Output Summary (Last 24 hours) at 12/16/2018 1205  Last data filed at 12/16/2018 0839  Gross per 24 hour   Intake 1440 ml   Output 1050 ml   Net 390 ml       Physical Exam:      General Appearance:    Lethargic,  in no acute distress   Head:    Normocephalic, without obvious abnormality, atraumatic   Eyes:           conjunctivae and sclerae normal, no icterus, no pallor   Ears:     no ear canal drainage   Throat:   No oral lesions, no thrush, oral mucosa dry   Neck:   No adenopathy, supple,  no JVD   Back:      No tenderness to percussion or palpation   Lungs:     respirations regular, even and unlabored, no wheezes or rhonchi    Heart:    Regular rhythm and normal rate, normal S1 and S2, no            murmur, no gallop, no rub   Chest Wall:    No abnormalities observed, no chest wall tenderness to palpation   Abdomen:     Normal bowel sounds, no masses,  soft non-tender, non-distended, no guarding, no rebound    Rectal:     Deferred   Extremities:    No Lower extremity edema, no cyanosis   Pulses:   Radial Pulses palpable   Skin:   No bleeding, bruising or rashes   Lymph nodes:   No palpable adenopathy in neck    Neurologic:   Cranial nerves 2 - 12 grossly intact, no gross motor deficits          Results Review:     Results from last 7 days   Lab Units  12/16/18   0537  12/15/18   0545  12/14/18   1737   SODIUM mmol/L  142  141  138   POTASSIUM mmol/L  3.7  3.8  4.1   CHLORIDE mmol/L  112*  107  99   CO2 mmol/L  23.0*  22.0*  22.0*   BUN mg/dL  34*  42*  46*   CREATININE mg/dL  2.70*  2.90*  3.20*   GLUCOSE mg/dL  108*  102*  115*   CALCIUM mg/dL  9.2  8.8  9.9     Results from last 7 days   Lab Units  12/16/18   0537  12/15/18   0545  12/14/18   1737   WBC 10*3/mm3  8.77  16.49*  21.01*   HEMOGLOBIN g/dL  9.9*  9.9*  12.0*   HEMATOCRIT %  30.1*  29.2*  35.0*   PLATELETS 10*3/mm3  306  286  343           Assessment/Plan        Community acquired pneumonia of left lower lobe of lung (CMS/HCC)    RA (rheumatoid arthritis) (CMS/HCC)    GERD (gastroesophageal reflux disease)    Acute kidney injury (CMS/HCC)    Dehydration    1. NAZ on ? CKD -- creatinine was 1.3 in ER in November with stones.  Certainly some pre-renal stress with dehydration and celebrex (stopped)       Seems to be improving with IVF's.  With recent stone issue need to re-do CT scan.  Will also run full serologies with microhematuria and H/O RA    2. Hypovolemia-  Cont IVF's    3. Microhematuria- on UA in November, repeat ordered.  Check UA and CT scan    4. Hypercalcemia on nOV labs-- Check PTH \PO4  with kidney stone    5. Nephrolithiasis- recheck CT    6. ? RA history- check RF      Plan:  CT scan  Serologies for GN + PTH/calcium  Cont IVFs  Am labs             Electronically signed by Steven Conte MD at 12/16/2018 12:24 PM

## 2018-12-18 NOTE — PROGRESS NOTES
"   LOS: 3 days    Patient Care Team:  Talya Venegas MD as PCP - General (Internal Medicine)    Chief Complaint:    Chief Complaint   Patient presents with   • Cough   • Nausea   • Nasal Congestion   • poor appetite     Follow UP NAZ   Subjective     Interval History:   Patient is having recurrent cough, feeling better, wants to go home, he denies any chest pain, no shortness of air, no nausea or vomiting, no abdominal pain, no dysuria or gross hematuria.    Objective     Vital Signs  Temp:  [97.8 °F (36.6 °C)-98.8 °F (37.1 °C)] 98.3 °F (36.8 °C)  Heart Rate:  [57-80] 57  Resp:  [16-18] 16  BP: (140-146)/(71-84) 145/83    Flowsheet Rows      First Filed Value   Admission Height  180.3 cm (71\") Documented at 12/14/2018 1650   Admission Weight  81.5 kg (179 lb 9.6 oz) Documented at 12/14/2018 1650          No intake/output data recorded.  I/O last 3 completed shifts:  In: 590 [P.O.:340; IV Piggyback:250]  Out: 1450 [Urine:1450]    Intake/Output Summary (Last 24 hours) at 12/18/2018 0747  Last data filed at 12/17/2018 2330  Gross per 24 hour   Intake 590 ml   Output 1000 ml   Net -410 ml       Physical Exam:  General Appearance: alert, oriented x 3, no acute distress,   Skin: warm and dry  HEENT: Nonicteric sclerae, oral mucosa normal,   Neck: supple, no JVD, trachea midline  Lungs: Bilateral rhonchi, unlabored breathing effort  Heart: RRR, normal S1 and S2, no S3, no rub  Abdomen: soft, non-tender,  present bowel sounds to auscultation  : no palpable bladder,  Extremities: no edema, cyanosis or clubbing  Neuro: normal speech and mental status       Results Review:    Results from last 7 days   Lab Units  12/18/18   0554  12/17/18   0551  12/16/18   1233  12/16/18   0537  12/15/18   0545  12/14/18   1737   SODIUM mmol/L  144  145   --   142  141  138   POTASSIUM mmol/L  3.7  3.9   --   3.7  3.8  4.1   CHLORIDE mmol/L  117*  116*   --   112*  107  99   CO2 mmol/L  18.0*  21.0*   --   23.0*  22.0*  22.0*   BUN " mg/dL  28*  31*   --   34*  42*  46*   CREATININE mg/dL  2.30*  2.60*   --   2.70*  2.90*  3.20*   CALCIUM mg/dL  9.1  9.1  9.3  9.2  8.8  9.9   BILIRUBIN mg/dL   --    --    --    --   0.9  1.3   ALK PHOS U/L   --    --    --    --   97  123   ALT (SGPT) U/L   --    --    --    --   43  38   AST (SGOT) U/L   --    --    --    --   39  46   GLUCOSE mg/dL  111*  109*   --   108*  102*  115*       Estimated Creatinine Clearance: 42 mL/min (A) (by C-G formula based on SCr of 2.3 mg/dL (H)).    Results from last 7 days   Lab Units  12/16/18   1233   PHOSPHORUS mg/dL  5.0*             Results from last 7 days   Lab Units  12/18/18   0554  12/16/18   0537  12/15/18   0545  12/14/18   1737   WBC 10*3/mm3  8.39  8.77  16.49*  21.01*   HEMOGLOBIN g/dL  9.5*  9.9*  9.9*  12.0*   PLATELETS 10*3/mm3  336  306  286  343               Imaging Results (last 24 hours)     ** No results found for the last 24 hours. **          ceftriaxone 1 g Intravenous Q24H   And      azithromycin 500 mg Intravenous Q24H   baclofen 10 mg Oral BID   DULoxetine 60 mg Oral Daily   ipratropium-albuterol 3 mL Nebulization 4x Daily - RT   lactobacillus acidophilus 1 capsule Oral Daily       Sodium chloride 75 mL/hr Last Rate: 75 mL/hr (12/17/18 1922)       Medication Review:   Current Facility-Administered Medications   Medication Dose Route Frequency Provider Last Rate Last Dose   • cefTRIAXone (ROCEPHIN) IVPB 1 g/50ml dextrose (premix)  1 g Intravenous Q24H Ada Shin  mL/hr at 12/17/18 1733 1 g at 12/17/18 1733    And   • AZITHROMYCIN 500 MG/250 ML 0.9% NS IVPB (vial-mate)  500 mg Intravenous Q24H Ada Shin MD   500 mg at 12/17/18 2121   • baclofen (LIORESAL) tablet 10 mg  10 mg Oral BID Dat Grier MD   10 mg at 12/17/18 2122   • DULoxetine (CYMBALTA) DR capsule 60 mg  60 mg Oral Daily Dat Grier MD   60 mg at 12/17/18 0850   • guaifenesin-dextromethorphan (ROBITUSSIN DM) 100-10 MG/5ML syrup 5 mL  5 mL Oral Q4H  PRN Ada Shin MD   5 mL at 12/16/18 2014   • ipratropium-albuterol (DUO-NEB) nebulizer solution 3 mL  3 mL Nebulization 4x Daily - RT Dat Grier MD   3 mL at 12/15/18 1204   • lactobacillus acidophilus (RISAQUAD) capsule 1 capsule  1 capsule Oral Daily Dami Boswell, DO   1 capsule at 12/17/18 0850   • ondansetron ODT (ZOFRAN-ODT) disintegrating tablet 4 mg  4 mg Oral Q8H PRN Dat Grier MD   4 mg at 12/14/18 2126   • sodium chloride 0.9 % infusion  75 mL/hr Intravenous Continuous Jake Bal MD 75 mL/hr at 12/17/18 1922 75 mL/hr at 12/17/18 1922       Assessment/Plan    1.  Acute kidney injury associated with volume depletion related to poor oral intake, improving slowly, creatinine down to 2.3, he is currently on IV fluid, normal saline at 75 cc per  2.  History of hematuria, repeat urinalysis showed that the hematuria has resolved  3.  Recently passed left small ureteral stone based on CT scan and patient is asymptomatic  4.  Pneumonia  5.  Hallucination and delirium has resolved.      Plan:  1.  Discontinue the IV fluid  2.  Surveillance labs          Aron Hinds MD  12/18/18  7:47 AM

## 2018-12-18 NOTE — DISCHARGE SUMMARY
[facility]   DISCHARGE SUMMARY      Name:  Toribio Dunaway   Age:  58 y.o.  Sex:  male  :  1960  MRN:  [id number]   Visit Number:  [id number]  Primary Care Physician:  Talya Venegas MD  Date of Discharge:  2018  Admission Date:  2018    Discharge Diagnosis:   1. Community acquired left lower lobe pneumonia, prior to admission, improved, uncertain organism     2. Acute renal failure due to dehydration/poor PO intake, improved     3. H/o hematuria, resolved, Repeat UA resolved hematuria.     4. Visual hallucinations, possible acute delirium vs encephalopathy acute metabolic,resolved     5. Recently passed left small ureteral stone, per CT scan, asymptomatic     6. 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  A chest x-ray was performed showing a left-sided infiltrate with a white blood cell count elevated of 21,000.  He was also dehydrated with acute kidney injury.  He was admitted to the hospitalist service for further evaluation and treatment.  There were no signs of sepsis on admission and he remained hemodynamically stable admitted to the medical floor.  Urinalysis showed hematuria.    Repeat urinalysis for hematuria was negative.  He had a CT scan of the abdomen and pelvis without contrast showing interval passage of small left ureteral stone with left lower lobe pneumonia.  The patient had no further abdominal pain and had improved appetite.    He was provided oxygen, DuoNeb's, IV Rocephin and Zithromax.  Nephrology consulted for his acute renal failure due to dehydration and volume depletion.  He received IV fluids with improvement of his kidney function. His creatinine has improved to 2.3 with stable hemoglobin of  9.5.  He has done well without IV fluids.  He has improved cough and denies chest pain or shortness of breath or any edema.  I changed his antibiotic from Rocephin and azithromycin to Omnicef.  His blood cultures remain negative ×3 days with negative influenza test.    He felt much better today.  He was eating and drinking more on his own.  He is ambulating per routine.  He is weaned off oxygen.  The patient insists on going home.  I discussed the case with nephrology, Dr Hinds, who agreed with his clinical improvement that he may be discharged home.  The patient was recommended to have a repeat blood test, ordered for later this week prior to follow-up with nephrology.  He is also recommended to follow-up with outpatient primary care in less than one week.  He will return if he worsens.    Consults:     Consults     Date and Time Order Name Status Description    12/18/2018 0730 Inpatient Nephrology Consult      12/16/2018 0949 Inpatient Nephrology Consult Completed           Procedures Performed:             Vital Signs:    Temp:  [97.3 °F (36.3 °C)-98.8 °F (37.1 °C)] 97.3 °F (36.3 °C)  Heart Rate:  [57-72] 57  Resp:  [16-18] 18  BP: (140-145)/(71-83) 142/76    Physical Exam:      General Appearance:    Alert, cooperative, in no acute distress   Head:    Normocephalic, without obvious abnormality, atraumatic   Eyes:            Lids and lashes normal, conjunctivae and sclerae normal, no   icterus, no pallor, corneas clear, PERRLA   Ears:    Ears appear intact with no abnormalities noted   Throat:   No oral lesions, no thrush, oral mucosa moist   Neck:   No adenopathy, supple, trachea midline, no thyromegaly, no   carotid bruit, no JVD   Back:     No kyphosis present, no scoliosis present, no skin lesions,      erythema or scars, no tenderness to percussion or                   palpation,   range of motion normal   Lungs:     Clear to auscultation,respirations regular, even and                  unlabored    Heart:     Regular rhythm and normal rate, normal S1 and S2, no            murmur, no gallop, no rub, no click   Chest Wall:    No abnormalities observed   Abdomen:     Normal bowel sounds, no masses, no organomegaly, soft        non-tender, non-distended, no guarding, no rebound                tenderness   Rectal:     Deferred   Extremities:   Moves all extremities well, no edema, no cyanosis, no             redness   Pulses:   Pulses palpable and equal bilaterally   Skin:   No bleeding, bruising or rash   Lymph nodes:   No palpable adenopathy   Neurologic:   Cranial nerves 2 - 12 grossly intact, sensation intact, DTR       present and equal bilaterally         Pertinent Lab Results:     Lab Results (all)     Procedure Component Value Units Date/Time    Glomerular Basement Membrane Antibodies [[id number]] Collected:  12/16/18 1233    Specimen:  Blood Updated:  12/18/18 1016     GBM Ab 3 units      Comment:                    Negative                   0 - 20                     Weak Positive             21 - 30                     Moderate to Strong Positive   >30       Narrative:       Performed at:  [address] [place] [address]  [id number]  : Madhuri Bryan MD, Phone:  [phone]    Basic Metabolic Panel [[id number]]  (Abnormal) Collected:  12/18/18 0554    Specimen:  Blood Updated:  12/18/18 0643     Glucose 111 mg/dL      BUN 28 mg/dL      Creatinine 2.30 mg/dL      Sodium 144 mmol/L      Potassium 3.7 mmol/L      Chloride 117 mmol/L      CO2 18.0 mmol/L      Calcium 9.1 mg/dL      eGFR Non African Amer 29 mL/min/1.73      BUN/Creatinine Ratio 12.2     Anion Gap 12.7 mmol/L     Narrative:       GFR Normal >60  Chronic Kidney Disease <60  Kidney Failure <15    CBC & Differential [[id number]] Collected:  12/18/18 0554    Specimen:  Blood Updated:  12/18/18 0621    Narrative:       The following orders were created for panel order CBC & Differential.  Procedure                                Abnormality         Status                     ---------                               -----------         ------                     CBC Auto Differential[[id number]]        Abnormal            Final result                 Please view results for these tests on the individual orders.    CBC Auto Differential [[id number]]  (Abnormal) Collected:  12/18/18 0554    Specimen:  Blood Updated:  12/18/18 0621     WBC 8.39 10*3/mm3      RBC 2.95 10*6/mm3      Hemoglobin 9.5 g/dL      Hematocrit 28.1 %      MCV 95.3 fL      MCH 32.2 pg      MCHC 33.8 g/dL      RDW 13.2 %      RDW-SD 46.2 fl      MPV 9.3 fL      Platelets 336 10*3/mm3      Neutrophil % 74.2 %      Lymphocyte % 14.9 %      Monocyte % 8.0 %      Eosinophil % 1.3 %      Basophil % 0.5 %      Immature Grans % 1.1 %      Neutrophils, Absolute 6.23 10*3/mm3      Lymphocytes, Absolute 1.25 10*3/mm3      Monocytes, Absolute 0.67 10*3/mm3      Eosinophils, Absolute 0.11 10*3/mm3      Basophils, Absolute 0.04 10*3/mm3      Immature Grans, Absolute 0.09 10*3/mm3      nRBC 0.0 /100 WBC     Hepatitis Panel, Acute [[id number]] Collected:  12/16/18 1233    Specimen:  Blood Updated:  12/18/18 0612     Hep A IgM Negative     Hepatitis B Surface Ag Negative     Hep B Core IgM Negative     Hep C Virus Ab <0.1 s/co ratio      Comment:                                   Negative:     < 0.8                               Indeterminate: 0.8 - 0.9                                    Positive:     > 0.9   The CDC recommends that a positive HCV antibody result   be followed up with a HCV Nucleic Acid Amplification   test (831052).       Narrative:       Performed at:  [facility]  [id number]  : Klever Reyez PhD, Phone:  [phone]    Blood Culture - Blood, Hand, Right [[id number]] Collected:  12/14/18 1834    Specimen:  Blood from Hand, Right Updated:  12/17/18 1900     Blood Culture No growth at 3 days    Blood Culture - Blood, Hand,  Left [[id number]] Collected:  18    Specimen:  Blood from Hand, Left Updated:  18 184     Blood Culture No growth at 3 days    C4+C3 [[id number]] Collected:  18 1233    Specimen:  Blood Updated:  18 3551     C3 Complement 158 mg/dL      C4 Complement 22 mg/dL     Narrative:       Performed at:   [address]  [id number]  : Klever Reyez PhD, Phone:  [phone]    Antinuclear Antibody With Reflex Cascade [[id number]] Collected:  18    Specimen:  Blood Updated:  18 1296     See below: Comment     Comment: Autoantibody                       Disease Association  ____________________________________________________________                          Condition                  Frequency  _____________________   ________________________   _________  Antinuclear Antibody,    SLE, mixed connective  Direct (NAKIA-D)           tissue diseases  _____________________   ________________________   _________  dsDNA                    SLE                        40 - 60%  _____________________   ________________________   _________  Chromatin                Drug induced SLE                90%                           SLE                        48 - 97%  _____________________   ________________________   _________  SSA (Ro)                 SLE                        25 - 35%                           Sjogren's Syndrome         40 - 70%                            Lupus                 100%  _____________________   ________________________   _________  SSB (La)                 SLE                             10%                           Sjogren's Syndrome              30%  _____________________   _______________________    _________  Sm (anti-Smith)          SLE                        15 - 30%  _____________________   _______________________    _________  RNP                      Mixed Connective Tissue                           Disease                          95%  (U1 nRNP,                SLE                        30 - 50%  anti-ribonucleoprotein)  Polymyositis and/or                           Dermatomyositis                 20%  _____________________   ________________________   _________  Scl-70 (antiDNA          Scleroderma (diffuse)      20 - 35%  topoisomerase)           Crest                           13%  _____________________   ________________________   _________  Liliana-1                     Polymyositis and/or                           Dermatomyositis            20 - 40%  _____________________   ________________________   _________  Centromere B             Scleroderma - Crest                           variant                         80%  _____________________   ________________________   _________  Ribosomal P              SLE                        10 - 20%        NAKIA Direct Negative     Comment: **Effective January 7, 2019, test 535252 Antinuclear Ab Reflex**    Stark will be made nonorderable. There is not a direct replacement    test. [facility] offers several NAKIA test options. Please refer to the    [facility] Directory of Services.       Narrative:       Performed at:  [address]  [id number]  : Klever Reyez PhD, Phone:  [phone]    PTH, Intact & Calcium [[id number]] Collected:  12/16/18 1233    Specimen:  Blood Updated:  12/17/18 1309     Calcium 9.3 mg/dL      PTH, Intact 31 pg/mL      PTH, Intact Comment     Comment: Interpretation                 Intact PTH    Calcium                                  (pg/mL)      (mg/dL)  Normal                          15 - 65     8.6 - 10.2  Primary Hyperparathyroidism         >65          >10.2  Secondary Hyperparathyroidism       >65          <10.2  Non-Parathyroid Hypercalcemia       <65          >10.2  Hypoparathyroidism                  <15          < 8.6  Non-Parathyroid Hypocalcemia    15 - 65          < 8.6       Narrative:        Performed at:  [address] [place] [address]  [id number]  : Klever Reyez PhD, Phone:  [phone]    Basic Metabolic Panel [[id number]]  (Abnormal) Collected:  12/17/18 0551    Specimen:  Blood Updated:  12/17/18 0632     Glucose 109 mg/dL      BUN 31 mg/dL      Creatinine 2.60 mg/dL      Sodium 145 mmol/L      Potassium 3.9 mmol/L      Chloride 116 mmol/L      CO2 21.0 mmol/L      Calcium 9.1 mg/dL      eGFR Non African Amer 25 mL/min/1.73      BUN/Creatinine Ratio 11.9     Anion Gap 11.9 mmol/L     Narrative:       GFR Normal >60  Chronic Kidney Disease <60  Kidney Failure <15    Sodium, Urine, Random - Urine, Clean Catch [[id number]]  (Normal) Collected:  12/16/18 1228    Specimen:  Urine, Clean Catch Updated:  12/16/18 1304     Sodium, Urine 59 mmol/L     Creatinine, Urine, Random - Urine, Clean Catch [[id number]] Collected:  12/16/18 1228    Specimen:  Urine, Clean Catch Updated:  12/16/18 1304     Creatinine, Urine 97.8 mg/dL     Narrative:       Reference intervals for random urine have not been established.  Clinical usage is dependent upon physician's interpretation in combination with other laboratory tests.     Phosphorus [[id number]]  (Abnormal) Collected:  12/16/18 1233    Specimen:  Blood Updated:  12/16/18 1300     Phosphorus 5.0 mg/dL     Urinalysis, Microscopic Only - Urine, Clean Catch [[id number]]  (Abnormal) Collected:  12/16/18 1228    Specimen:  Urine, Clean Catch Updated:  12/16/18 1253     RBC, UA None Seen /HPF      WBC, UA 0-2 /HPF      Bacteria, UA Trace /HPF      Squamous Epithelial Cells, UA 3-6 /HPF      Hyaline Casts, UA 0-2 /LPF      Mucus, UA Trace /HPF      Methodology Manual Light Microscopy    Urinalysis With Microscopic If Indicated (No Culture) - Urine, Clean Catch [[id number]]  (Abnormal) Collected:  12/16/18 1228    Specimen:  Urine, Clean Catch Updated:  12/16/18 1247     Color, UA Dark Yellow     Appearance, UA Clear     pH, UA 5.5     Specific  Gravity, UA 1.020     Glucose, UA Negative     Ketones, UA 15 mg/dL (1+)     Bilirubin, UA Negative     Blood, UA Negative     Protein, UA 30 mg/dL (1+)     Leuk Esterase, UA Negative     Nitrite, UA Negative     Urobilinogen, UA 1.0 E.U./dL    Protein Elec + Interp, Serum [[id number]] Collected:  12/16/18 1233    Specimen:  Blood Updated:  12/16/18 1233    Rheumatoid Factor, IgG/M/A [[id number]] Collected:  12/16/18 1233    Specimen:  Blood Updated:  12/16/18 1233    ANCA Panel [[id number]] Collected:  12/16/18 1233    Specimen:  Blood Updated:  12/16/18 1233    Basic Metabolic Panel [[id number]]  (Abnormal) Collected:  12/16/18 0537    Specimen:  Blood Updated:  12/16/18 0617     Glucose 108 mg/dL      BUN 34 mg/dL      Creatinine 2.70 mg/dL      Sodium 142 mmol/L      Potassium 3.7 mmol/L      Chloride 112 mmol/L      CO2 23.0 mmol/L      Calcium 9.2 mg/dL      eGFR Non African Amer 24 mL/min/1.73      BUN/Creatinine Ratio 12.6     Anion Gap 10.7 mmol/L     Narrative:       GFR Normal >60  Chronic Kidney Disease <60  Kidney Failure <15    CBC & Differential [[id number]] Collected:  12/16/18 0537    Specimen:  Blood Updated:  12/16/18 0606    Narrative:       The following orders were created for panel order CBC & Differential.  Procedure                               Abnormality         Status                     ---------                               -----------         ------                     CBC Auto Differential[[id number]]        Abnormal            Final result                 Please view results for these tests on the individual orders.    CBC Auto Differential [[id number]]  (Abnormal) Collected:  12/16/18 0537    Specimen:  Blood Updated:  12/16/18 0606     WBC 8.77 10*3/mm3      RBC 3.14 10*6/mm3      Hemoglobin 9.9 g/dL      Hematocrit 30.1 %      MCV 95.9 fL      MCH 31.5 pg      MCHC 32.9 g/dL      RDW 12.9 %      RDW-SD 45.3 fl      MPV 8.8 fL      Platelets 306 10*3/mm3      Neutrophil % 77.8 %       Lymphocyte % 10.9 %      Monocyte % 8.7 %      Eosinophil % 0.5 %      Basophil % 0.3 %      Immature Grans % 1.8 %      Neutrophils, Absolute 6.82 10*3/mm3      Lymphocytes, Absolute 0.96 10*3/mm3      Monocytes, Absolute 0.76 10*3/mm3      Eosinophils, Absolute 0.04 10*3/mm3      Basophils, Absolute 0.03 10*3/mm3      Immature Grans, Absolute 0.16 10*3/mm3      nRBC 0.0 /100 WBC     Comprehensive Metabolic Panel [[id number]]  (Abnormal) Collected:  12/15/18 0545    Specimen:  Blood Updated:  12/15/18 0633     Glucose 102 mg/dL      BUN 42 mg/dL      Creatinine 2.90 mg/dL      Sodium 141 mmol/L      Potassium 3.8 mmol/L      Chloride 107 mmol/L      CO2 22.0 mmol/L      Calcium 8.8 mg/dL      Total Protein 6.4 g/dL      Albumin 3.40 g/dL      ALT (SGPT) 43 U/L      AST (SGOT) 39 U/L      Alkaline Phosphatase 97 U/L      Total Bilirubin 0.9 mg/dL      eGFR Non African Amer 22 mL/min/1.73      Globulin 3.0 gm/dL      A/G Ratio 1.1 g/dL      BUN/Creatinine Ratio 14.5     Anion Gap 15.8 mmol/L     Narrative:       GFR Normal >60  Chronic Kidney Disease <60  Kidney Failure <15    CBC (No Diff) [[id number]]  (Abnormal) Collected:  12/15/18 0545    Specimen:  Blood Updated:  12/15/18 0629     WBC 16.49 10*3/mm3      RBC 3.07 10*6/mm3      Hemoglobin 9.9 g/dL      Hematocrit 29.2 %      MCV 95.1 fL      MCH 32.2 pg      MCHC 33.9 g/dL      RDW 12.5 %      RDW-SD 43.4 fl      MPV 8.9 fL      Platelets 286 10*3/mm3     Lactic Acid, Plasma [[id number]]  (Normal) Collected:  12/14/18 1832    Specimen:  Blood Updated:  12/14/18 1852     Lactate 0.7 mmol/L     Comprehensive Metabolic Panel [[id number]]  (Abnormal) Collected:  12/14/18 1737    Specimen:  Blood Updated:  12/14/18 1757     Glucose 115 mg/dL      BUN 46 mg/dL      Creatinine 3.20 mg/dL      Sodium 138 mmol/L      Potassium 4.1 mmol/L      Chloride 99 mmol/L      CO2 22.0 mmol/L      Calcium 9.9 mg/dL      Total Protein 8.1 g/dL      Albumin 4.50 g/dL      ALT (SGPT) 38  U/L      AST (SGOT) 46 U/L      Alkaline Phosphatase 123 U/L      Total Bilirubin 1.3 mg/dL      eGFR Non African Amer 20 mL/min/1.73      Globulin 3.6 gm/dL      A/G Ratio 1.3 g/dL      BUN/Creatinine Ratio 14.4     Anion Gap 21.1 mmol/L     Narrative:       GFR Normal >60  Chronic Kidney Disease <60  Kidney Failure <15    Influenza Antigen, Rapid - Swab, Nasopharynx [[id number]]  (Normal) Collected:  12/14/18 1737    Specimen:  Swab from Nasopharynx Updated:  12/14/18 1754     Influenza A Ag, EIA Negative     Influenza B Ag, EIA Negative    CBC & Differential [[id number]] Collected:  12/14/18 1737    Specimen:  Blood Updated:  12/14/18 1745    Narrative:       The following orders were created for panel order CBC & Differential.  Procedure                               Abnormality         Status                     ---------                               -----------         ------                     CBC Auto Differential[[id number]]        Abnormal            Final result                 Please view results for these tests on the individual orders.    CBC Auto Differential [[id number]]  (Abnormal) Collected:  12/14/18 1737    Specimen:  Blood Updated:  12/14/18 1745     WBC 21.01 10*3/mm3      RBC 3.72 10*6/mm3      Hemoglobin 12.0 g/dL      Hematocrit 35.0 %      MCV 94.1 fL      MCH 32.3 pg      MCHC 34.3 g/dL      RDW 12.2 %      RDW-SD 42.5 fl      MPV 8.5 fL      Platelets 343 10*3/mm3      Neutrophil % 87.3 %      Lymphocyte % 3.5 %      Monocyte % 6.9 %      Eosinophil % 0.0 %      Basophil % 0.3 %      Immature Grans % 2.0 %      Neutrophils, Absolute 18.32 10*3/mm3      Lymphocytes, Absolute 0.74 10*3/mm3      Monocytes, Absolute 1.46 10*3/mm3      Eosinophils, Absolute 0.01 10*3/mm3      Basophils, Absolute 0.06 10*3/mm3      Immature Grans, Absolute 0.42 10*3/mm3      nRBC 0.0 /100 WBC           Pertinent Radiology Results:    Imaging Results (all)     Procedure Component Value Units Date/Time    CT  Abdomen Pelvis Without Contrast [[id number]] Collected:  12/16/18 1357     Updated:  12/16/18 1402    Narrative:       PROCEDURE: CT ABDOMEN PELVIS WO CONTRAST-     HISTORY: Kidney stone, known, follow up        COMPARISON: November 13, 2018     TECHNIQUE: Axial CT images of the abdomen and pelvis were obtained  without contrast. Coronal reformatted images were also obtained.This  study was performed with techniques to keep radiation doses as low as  reasonably achievable, (ALARA). Individualized dose reduction techniques  using automated exposure control or adjustment of mA and/or kV according  to the patient size were employed.        FINDINGS:      ABDOMEN: There is new left lower lobe consolidation consistent with  pneumonia.  There is a small left pleural effusion.  There is no  evidence of renal stone or hydronephrosis.  Again noted is a probable  cyst in the anterior left kidney.  The liver, spleen and pancreas have  an unremarkable unenhanced appearance.  No mass or adenopathy is seen.   No inflammatory process is identified.         PELVIS:  Images of the pelvis reveal no evidence of ureteral dilatation  or ureteral stone. The small distal left ureteral stone seen on the  prior exam is no longer visualized. There is a small amount of free  fluid which is likely reactive.            Impression:        Interval passage of the small left ureteral stone.      New left lower lobe pneumonia.                  This report was finalized on 12/16/2018 2:00 PM by Yamil José MD.    XR Chest 2 View [[id number]] Collected:  12/14/18 1738     Updated:  12/14/18 1739    Narrative:       FINAL REPORT    CLINICAL HISTORY:  soa, cough    COMPARISON:  None.    FINDINGS:  There is a hazy left lung and in frontal view.  Lateral view  demonstrates increased density  across the lower  thoracic spine  and findings are highly suggestive of a developing  consolidation in the posterior left lung base. No effusion or  pneumothorax.  Heart size is normal. No acute bony abnormality.      Impression:       Findings suggestive of a developing consolidation in the  posterior left lung base  that could represent a pneumonia given  the history.  Appropriate treatment and followup x-rays in 6  weeks is recommended to ensure proper resolution.    Authenticated by Madhu Redyd MD on 12/14/2018 05:38:45 PM          Condition on Discharge:  Stable        Code status during the hospital stay:        Discharge Disposition:    Home or Self Care    Discharge Medication:       Discharge Medications      New Medications      Instructions Start Date   cefdinir 300 MG capsule  Commonly known as:  OMNICEF   300 mg, Oral, 2 Times Daily         Continue These Medications      Instructions Start Date   aspirin 81 MG EC tablet   81 mg, Oral, Daily      baclofen 10 MG tablet  Commonly known as:  LIORESAL   10 mg, Oral, 2 Times Daily      celecoxib 200 MG capsule  Commonly known as:  CeleBREX   200 mg, Oral, Daily      DULoxetine 60 MG capsule  Commonly known as:  CYMBALTA   60 mg, Oral, Daily      gabapentin 600 MG tablet  Commonly known as:  NEURONTIN   600 mg, Oral, 3 Times Daily      Omega 3 1000 MG capsule   1 capsule, Oral, Daily      ondansetron ODT 4 MG disintegrating tablet  Commonly known as:  ZOFRAN-ODT   4 mg, Oral, Every 8 Hours PRN      TRAMADOL HCL PO   50 mg, Oral, 3 Times Daily      vitamin C 500 MG tablet  Commonly known as:  ASCORBIC ACID   500 mg, Oral, Daily             Discharge Diet:     Diet Instructions     Diet: Regular      Discharge Diet:  Regular          Activity at Discharge:     Activity Instructions     Activity as Tolerated            Follow-up Appointments:    No future appointments.  Additional Instructions for the Follow-ups that You Need to Schedule     Discharge Follow-up with PCP   As directed       Currently Documented PCP:    Talya Venegas MD    PCP Phone Number:    [phone]     Follow Up Details:  One week  followup         Discharge Follow-up with Specialty: Evy 3-5 days   As directed      Specialty:  Evy 3-5 days               Test Results Pending at Discharge:     Order Current Status    ANCA Panel In process    Protein Elec + Interp, Serum In process    Rheumatoid Factor, IgG/M/A In process    Blood Culture - Blood, Hand, Left Preliminary result    Blood Culture - Blood, Hand, Right Preliminary result             Jazzy Arellano,   12/18/18  1:15 PM      Medication risks and benefits were discussed in great detail. The patient reported being satisfied with the current treatment plan and the care delivered while hospitalized.     Time:  I spent 35 minutes preparing discharge counseling and teaching.

## 2018-12-18 NOTE — PLAN OF CARE
Problem: Patient Care Overview  Goal: Plan of Care Review  Outcome: Ongoing (interventions implemented as appropriate)   12/18/18 7366   Coping/Psychosocial   Plan of Care Reviewed With patient;spouse   OTHER   Outcome Summary Patient VSS. Will discharge home with wife to assist as necc. No reports pain nor SOA. Discharge teaching and apts complete.   Plan of Care Review   Progress improving

## 2018-12-19 ENCOUNTER — READMISSION MANAGEMENT (OUTPATIENT)
Dept: CALL CENTER | Facility: HOSPITAL | Age: 58
End: 2018-12-19

## 2018-12-19 LAB
BACTERIA SPEC AEROBE CULT: NORMAL
BACTERIA SPEC AEROBE CULT: NORMAL
RF IGA SER-ACNC: NORMAL [IU]/ML
RF IGG SER-ACNC: NORMAL EU/ML
RF IGM SER-ACNC: NORMAL [IU]/ML

## 2018-12-19 NOTE — OUTREACH NOTE
Prep Survey      Responses   Facility patient discharged from?  [facility]   Is patient eligible?  Yes   Discharge diagnosis  LLL Pneumonia   Does the patient have one of the following disease processes/diagnoses(primary or secondary)?  COPD/Pneumonia   Does the patient have Home health ordered?  No   Is there a DME ordered?  No   Comments regarding appointments  Please see AVS   Prep survey completed?  Yes          Luiza Aparicio RN

## 2018-12-20 ENCOUNTER — READMISSION MANAGEMENT (OUTPATIENT)
Dept: CALL CENTER | Facility: HOSPITAL | Age: 58
End: 2018-12-20

## 2018-12-20 NOTE — OUTREACH NOTE
COPD/PN Week 1 Survey      Responses   Facility patient discharged from?  [facility]   Does the patient have one of the following disease processes/diagnoses(primary or secondary)?  COPD/Pneumonia   Is there a successful TCM telephone encounter documented?  No   Was the primary reason for admission:  Pneumonia   Week 1 attempt successful?  No   Unsuccessful attempts  Attempt 1          Donna Garcias RN

## 2018-12-21 ENCOUNTER — READMISSION MANAGEMENT (OUTPATIENT)
Dept: CALL CENTER | Facility: HOSPITAL | Age: 58
End: 2018-12-21

## 2018-12-21 NOTE — OUTREACH NOTE
COPD/PN Week 1 Survey      Responses   Facility patient discharged from?  [facility]   Does the patient have one of the following disease processes/diagnoses(primary or secondary)?  COPD/Pneumonia   Is there a successful TCM telephone encounter documented?  No   Was the primary reason for admission:  Pneumonia   Week 1 attempt successful?  No   Unsuccessful attempts  Attempt 2          Cesilia Wang RN

## 2018-12-24 ENCOUNTER — READMISSION MANAGEMENT (OUTPATIENT)
Dept: CALL CENTER | Facility: HOSPITAL | Age: 58
End: 2018-12-24

## 2018-12-24 NOTE — OUTREACH NOTE
COPD/PN Week 1 Survey      Responses   Facility patient discharged from?  [facility]   Does the patient have one of the following disease processes/diagnoses(primary or secondary)?  COPD/Pneumonia   Is there a successful TCM telephone encounter documented?  No   Was the primary reason for admission:  Pneumonia   Week 1 attempt successful?  No   Unsuccessful attempts  Attempt 3          Paulina Gant RN

## 2018-12-28 ENCOUNTER — READMISSION MANAGEMENT (OUTPATIENT)
Dept: CALL CENTER | Facility: HOSPITAL | Age: 58
End: 2018-12-28

## 2018-12-28 NOTE — OUTREACH NOTE
COPD/PN Week 1 Survey      Responses   Facility patient discharged from?  [facility]   Does the patient have one of the following disease processes/diagnoses(primary or secondary)?  COPD/Pneumonia   Is there a successful TCM telephone encounter documented?  No   Was the primary reason for admission:  Pneumonia   Week 1 attempt successful?  No   Unsuccessful attempts  Attempt 4   Rescheduled  Revoked   Revoke  Decline to participate          Connie Hardy RN

## 2019-01-02 LAB
RF IGA SER-ACNC: 5 EU/ML
RF IGG SER-ACNC: 10.3 EU/ML
RF IGM SER-ACNC: 7 IU/ML

## 2019-06-26 ENCOUNTER — TRANSCRIBE ORDERS (OUTPATIENT)
Dept: ADMINISTRATIVE | Facility: HOSPITAL | Age: 59
End: 2019-06-26

## 2019-06-26 DIAGNOSIS — N18.30 CHRONIC KIDNEY DISEASE, STAGE III (MODERATE) (HCC): Primary | ICD-10-CM

## 2019-10-14 ENCOUNTER — APPOINTMENT (OUTPATIENT)
Dept: LAB | Facility: HOSPITAL | Age: 59
End: 2019-10-14

## 2019-10-14 ENCOUNTER — TRANSCRIBE ORDERS (OUTPATIENT)
Dept: ADMINISTRATIVE | Facility: HOSPITAL | Age: 59
End: 2019-10-14

## 2019-10-14 ENCOUNTER — HOSPITAL ENCOUNTER (OUTPATIENT)
Dept: ULTRASOUND IMAGING | Facility: HOSPITAL | Age: 59
Discharge: HOME | End: 2019-10-14
Admitting: PHYSICIAN ASSISTANT

## 2019-10-14 DIAGNOSIS — N18.30 CHRONIC KIDNEY DISEASE, STAGE III (MODERATE) (HCC): ICD-10-CM

## 2019-10-14 DIAGNOSIS — N18.30 CHRONIC KIDNEY DISEASE, STAGE III (MODERATE) (HCC): Primary | ICD-10-CM

## 2019-10-14 LAB
25(OH)D3 SERPL-MCNC: 12.8 NG/ML (ref 30–100)
ALBUMIN SERPL-MCNC: 4.7 G/DL (ref 3.5–5.2)
ANION GAP SERPL CALCULATED.3IONS-SCNC: 14.1 MMOL/L (ref 5–15)
BUN BLD-MCNC: 18 MG/DL (ref 6–20)
BUN/CREAT SERPL: 13.7 (ref 7–25)
CALCIUM SPEC-SCNC: 9.7 MG/DL (ref 8.6–10.5)
CHLORIDE SERPL-SCNC: 106 MMOL/L (ref 98–107)
CO2 SERPL-SCNC: 24.9 MMOL/L (ref 22–29)
CREAT BLD-MCNC: 1.31 MG/DL (ref 0.76–1.27)
DEPRECATED RDW RBC AUTO: 45.3 FL (ref 37–54)
ERYTHROCYTE [DISTWIDTH] IN BLOOD BY AUTOMATED COUNT: 12.6 % (ref 12.3–15.4)
GFR SERPL CREATININE-BSD FRML MDRD: 56 ML/MIN/1.73
GLUCOSE BLD-MCNC: 96 MG/DL (ref 65–99)
HCT VFR BLD AUTO: 40.5 % (ref 37.5–51)
HGB BLD-MCNC: 13.8 G/DL (ref 13–17.7)
MCH RBC QN AUTO: 33.3 PG (ref 26.6–33)
MCHC RBC AUTO-ENTMCNC: 34.1 G/DL (ref 31.5–35.7)
MCV RBC AUTO: 97.6 FL (ref 79–97)
PHOSPHATE SERPL-MCNC: 3.6 MG/DL (ref 2.5–4.5)
PLATELET # BLD AUTO: 335 10*3/MM3 (ref 140–450)
PMV BLD AUTO: 9 FL (ref 6–12)
POTASSIUM BLD-SCNC: 4.4 MMOL/L (ref 3.5–5.2)
PTH-INTACT SERPL-MCNC: 62.4 PG/ML (ref 15–65)
RBC # BLD AUTO: 4.15 10*6/MM3 (ref 4.14–5.8)
SODIUM BLD-SCNC: 145 MMOL/L (ref 136–145)
URATE SERPL-MCNC: 6.1 MG/DL (ref 3.4–7)
WBC NRBC COR # BLD: 6.33 10*3/MM3 (ref 3.4–10.8)

## 2019-10-14 PROCEDURE — 83970 ASSAY OF PARATHORMONE: CPT | Performed by: PHYSICIAN ASSISTANT

## 2019-10-14 PROCEDURE — 84156 ASSAY OF PROTEIN URINE: CPT | Performed by: PHYSICIAN ASSISTANT

## 2019-10-14 PROCEDURE — 88184 FLOWCYTOMETRY/ TC 1 MARKER: CPT

## 2019-10-14 PROCEDURE — 88185 FLOWCYTOMETRY/TC ADD-ON: CPT

## 2019-10-14 PROCEDURE — 82306 VITAMIN D 25 HYDROXY: CPT | Performed by: PHYSICIAN ASSISTANT

## 2019-10-14 PROCEDURE — 36415 COLL VENOUS BLD VENIPUNCTURE: CPT | Performed by: PHYSICIAN ASSISTANT

## 2019-10-14 PROCEDURE — 84550 ASSAY OF BLOOD/URIC ACID: CPT | Performed by: PHYSICIAN ASSISTANT

## 2019-10-14 PROCEDURE — 84166 PROTEIN E-PHORESIS/URINE/CSF: CPT | Performed by: PHYSICIAN ASSISTANT

## 2019-10-14 PROCEDURE — 88189 FLOWCYTOMETRY/READ 16 & >: CPT | Performed by: PHYSICIAN ASSISTANT

## 2019-10-14 PROCEDURE — 80069 RENAL FUNCTION PANEL: CPT | Performed by: PHYSICIAN ASSISTANT

## 2019-10-14 PROCEDURE — 76775 US EXAM ABDO BACK WALL LIM: CPT

## 2019-10-14 PROCEDURE — 85027 COMPLETE CBC AUTOMATED: CPT | Performed by: PHYSICIAN ASSISTANT

## 2019-10-17 LAB
ALBUMIN 24H MFR UR ELPH: 32.9 %
ALPHA1 GLOB 24H MFR UR ELPH: 3 %
ALPHA2 GLOB 24H MFR UR ELPH: 14.7 %
ASSESSMENT OF LEUKOCYTES: NORMAL
B-GLOBULIN MFR UR ELPH: 35.7 %
CLINICAL INFO: NORMAL
CONV COMMENT: NORMAL
GAMMA GLOB 24H MFR UR ELPH: 13.7 %
GATING STRATEGY: NORMAL
Lab: NORMAL
Lab: NORMAL
M-SPIKE, UR: NORMAL %
PATH INTERP SPEC-IMP: NORMAL
PHENOTYPE CHART: NORMAL
PROT UR-MCNC: 11.1 MG/DL
SPECIMEN SOURCE: NORMAL
VIABLE CELLS NFR SPEC: NORMAL %

## 2021-04-01 PROCEDURE — U0004 COV-19 TEST NON-CDC HGH THRU: HCPCS | Performed by: PERSONAL EMERGENCY RESPONSE ATTENDANT

## 2022-01-24 PROCEDURE — U0004 COV-19 TEST NON-CDC HGH THRU: HCPCS | Performed by: NURSE PRACTITIONER

## 2022-03-23 ENCOUNTER — OFFICE VISIT (OUTPATIENT)
Dept: GASTROENTEROLOGY | Facility: CLINIC | Age: 62
End: 2022-03-23

## 2022-03-23 VITALS
BODY MASS INDEX: 27.65 KG/M2 | HEART RATE: 74 BPM | HEIGHT: 71 IN | DIASTOLIC BLOOD PRESSURE: 68 MMHG | RESPIRATION RATE: 18 BRPM | TEMPERATURE: 96.7 F | SYSTOLIC BLOOD PRESSURE: 128 MMHG | WEIGHT: 197.5 LBS | OXYGEN SATURATION: 99 %

## 2022-03-23 DIAGNOSIS — Z12.11 ENCOUNTER FOR SCREENING FOR MALIGNANT NEOPLASM OF COLON: Primary | ICD-10-CM

## 2022-03-23 DIAGNOSIS — Z80.0 FAMILY HISTORY OF COLON CANCER: ICD-10-CM

## 2022-03-23 PROBLEM — K21.9 GERD (GASTROESOPHAGEAL REFLUX DISEASE): Status: RESOLVED | Noted: 2018-12-14 | Resolved: 2022-03-23

## 2022-03-23 PROCEDURE — S0285 CNSLT BEFORE SCREEN COLONOSC: HCPCS | Performed by: NURSE PRACTITIONER

## 2022-03-23 RX ORDER — SODIUM CHLORIDE 9 MG/ML
70 INJECTION, SOLUTION INTRAVENOUS CONTINUOUS PRN
Status: CANCELLED | OUTPATIENT
Start: 2022-03-23

## 2022-03-23 RX ORDER — PEG-3350, SODIUM SULFATE, SODIUM CHLORIDE, POTASSIUM CHLORIDE, SODIUM ASCORBATE AND ASCORBIC ACID 7.5-2.691G
1000 KIT ORAL TAKE AS DIRECTED
Qty: 1 EACH | Refills: 0 | Status: SHIPPED | OUTPATIENT
Start: 2022-03-23 | End: 2022-05-24 | Stop reason: HOSPADM

## 2022-03-23 NOTE — PATIENT INSTRUCTIONS
Colonoscopy: The indications, technique, alternatives and potential risk and complications were discussed with the patient including but not limited to bleeding, perforations, missing lesions and anesthetic complications. The patient understands and wishes to proceed with the procedure and has given their verbal consent. Written patient education information was given to the patient.   The patient will call if they have further questions before procedure.

## 2022-03-23 NOTE — PROGRESS NOTES
New Patient Consult      Date: 2022   Patient Name: Toribio Dunaway  MRN: [id number]  : 1960     Primary Care Provider: Talay Venegas MD    Chief Complaint   Patient presents with   • Colonoscopy     History of Present Illness: Toribio Dunaway is a 61 y.o. male who is here today to establish care with Gastroenterology for colon cancer screening.     The patient denies recent change in bowel habits. There is no diarrhea or constipation. There is no history of abdominal pain. There is no history of overt GI bleed (hematemesis melena or hematochezia). The patient denies nausea or vomiting. There is no history of reflux. The patient denies dysphagia or odynophagia. There is no history of recent significant weight loss. There is no history of liver disease in the past. There is a family history of colon cancer that was recently diagnosed in his brother at age 56.  GI malignancy. The patient's last colonoscopy was around .    Subjective      Past Medical History:   Diagnosis Date   • Anemia    • Back pain    • GERD (gastroesophageal reflux disease)    • Hepatitis    • Hyperlipidemia    • Injury of back    • Kidney stone    • Multiple lung nodules on CT    • RA (rheumatoid arthritis) (HCC)      Past Surgical History:   Procedure Laterality Date   • COLONOSCOPY     • LASIK     • TESTICLE SURGERY       Family History   Problem Relation Age of Onset   • Heart disease Mother    • Lung disease Mother    • Heart disease Father    • Colon cancer Brother 56   • Heart disease Brother    • Diabetes Maternal Grandmother    • Asthma Other    • Drug abuse Other    • Heart attack Other    • Colon polyps Neg Hx    • Crohn's disease Neg Hx    • Irritable bowel syndrome Neg Hx    • Liver cancer Neg Hx    • Liver disease Neg Hx    • Rectal cancer Neg Hx    • Stomach cancer Neg Hx      Social History     Socioeconomic History   • Marital status:    Tobacco Use   • Smoking status: Never Smoker   •  Smokeless tobacco: Never Used   Vaping Use   • Vaping Use: Never used   Substance and Sexual Activity   • Alcohol use: Not Currently     Comment: Occ   • Drug use: No   • Sexual activity: Defer       Current Outpatient Medications:   •  aspirin 81 MG EC tablet, Take 81 mg by mouth Daily., Disp: , Rfl:   •  baclofen (LIORESAL) 10 MG tablet, Take 10 mg by mouth 2 (Two) Times a Day., Disp: , Rfl:   •  celecoxib (CeleBREX) 200 MG capsule, Take 200 mg by mouth Daily., Disp: , Rfl:   •  DULoxetine (CYMBALTA) 60 MG capsule, Take 60 mg by mouth 2 (Two) Times a Day., Disp: , Rfl:   •  Omega 3 1000 MG capsule, Take 1 capsule by mouth Daily., Disp: , Rfl:   •  pravastatin (PRAVACHOL) 40 MG tablet, Take 40 mg by mouth Daily., Disp: , Rfl:   •  vitamin B-12 (CYANOCOBALAMIN) 1000 MCG tablet, Take 1,000 mcg by mouth Daily., Disp: , Rfl:   •  vitamin C (ASCORBIC ACID) 500 MG tablet, Take 500 mg by mouth Daily., Disp: , Rfl:   •  PEG-KCl-NaCl-NaSulf-Na Asc-C (MOVIPREP) 100 g reconstituted solution powder, Take 1,000 mL by mouth Take As Directed., Disp: 1 each, Rfl: 0    No Known Allergies     The following portions of the patient's history were reviewed and updated as appropriate: allergies, current medications, past family history, past medical history, past social history, past surgical history and problem list.    Objective     Physical Exam  Vitals and nursing note reviewed.   Constitutional:       General: He is not in acute distress.     Appearance: Normal appearance. He is well-developed.   HENT:      Head: Normocephalic and atraumatic.      Mouth/Throat:      Mouth: Mucous membranes are not pale, not dry and not cyanotic.   Eyes:      General: Lids are normal.   Neck:      Trachea: Trachea normal.   Cardiovascular:      Rate and Rhythm: Normal rate.   Pulmonary:      Effort: Pulmonary effort is normal. No respiratory distress.      Breath sounds: Normal breath sounds.   Abdominal:      General: Bowel sounds are normal.       "Palpations: Abdomen is soft. There is no mass.      Tenderness: There is no abdominal tenderness.      Hernia: No hernia is present.   Skin:     General: Skin is warm and dry.   Neurological:      Mental Status: He is alert and oriented to person, place, and time.   Psychiatric:         Mood and Affect: Mood normal.         Speech: Speech normal.         Behavior: Behavior normal. Behavior is cooperative.       Vitals:    03/23/22 0956   BP: 128/68   Pulse: 74   Resp: 18   Temp: 96.7 °F (35.9 °C)   SpO2: 99%   Weight: 89.6 kg (197 lb 8 oz)   Height: 180.3 cm (71\")     Body mass index is 27.55 kg/m².     Results Review:   I have reviewed the patient's new clinical and imaging results.    Admission on 01/24/2022, Discharged on 01/24/2022   Component Date Value Ref Range Status   • SARS-CoV-2 EVELYN 01/24/2022 Not Detected  Not Detected Final      Assessment / Plan      1. Encounter for screening for malignant neoplasm of colon  2. Family history of colon cancer  His last colonoscopy was around 2010 and was normal per patient.  The patient's brother recently got diagnosed with colon cancer at age 56.  Colonoscopy for screening.    - Case Request; Standing  - Case Request  - PEG-KCl-NaCl-NaSulf-Na Asc-C (MOVIPREP) 100 g reconstituted solution powder; Take 1,000 mL by mouth Take As Directed.  Dispense: 1 each; Refill: 0    Patient Instructions   1. Colonoscopy: The indications, technique, alternatives and potential risk and complications were discussed with the patient including but not limited to bleeding, perforations, missing lesions and anesthetic complications. The patient understands and wishes to proceed with the procedure and has given their verbal consent. Written patient education information was given to the patient.   2. The patient will call if they have further questions before procedure.       Pascual Arce, APRN  3/23/2022    Please note that portions of this note may have been completed with a voice " recognition program. Efforts were made to edit the dictations, but occasionally words are mistranscribed.

## 2022-05-17 PROBLEM — Z12.11 ENCOUNTER FOR SCREENING FOR MALIGNANT NEOPLASM OF COLON: Status: ACTIVE | Noted: 2022-05-17

## 2022-05-24 ENCOUNTER — ANESTHESIA EVENT (OUTPATIENT)
Dept: GASTROENTEROLOGY | Facility: HOSPITAL | Age: 62
End: 2022-05-24

## 2022-05-24 ENCOUNTER — ANESTHESIA (OUTPATIENT)
Dept: GASTROENTEROLOGY | Facility: HOSPITAL | Age: 62
End: 2022-05-24

## 2022-05-24 ENCOUNTER — HOSPITAL ENCOUNTER (OUTPATIENT)
Facility: HOSPITAL | Age: 62
Setting detail: HOSPITAL OUTPATIENT SURGERY
Discharge: HOME | End: 2022-05-24
Attending: INTERNAL MEDICINE | Admitting: INTERNAL MEDICINE

## 2022-05-24 VITALS
WEIGHT: 205 LBS | HEIGHT: 71 IN | HEART RATE: 74 BPM | TEMPERATURE: 96.9 F | SYSTOLIC BLOOD PRESSURE: 111 MMHG | OXYGEN SATURATION: 98 % | BODY MASS INDEX: 28.7 KG/M2 | DIASTOLIC BLOOD PRESSURE: 70 MMHG | RESPIRATION RATE: 16 BRPM

## 2022-05-24 DIAGNOSIS — Z12.11 ENCOUNTER FOR SCREENING FOR MALIGNANT NEOPLASM OF COLON: ICD-10-CM

## 2022-05-24 PROCEDURE — 25010000002 PROPOFOL 200 MG/20ML EMULSION: Performed by: NURSE ANESTHETIST, CERTIFIED REGISTERED

## 2022-05-24 PROCEDURE — 45385 COLONOSCOPY W/LESION REMOVAL: CPT | Performed by: INTERNAL MEDICINE

## 2022-05-24 DEVICE — DEV CLIP ENDO RESOLUTION360 CONTRL ROT 235CM: Type: IMPLANTABLE DEVICE | Site: RECTUM | Status: FUNCTIONAL

## 2022-05-24 RX ORDER — SODIUM CHLORIDE 9 MG/ML
70 INJECTION, SOLUTION INTRAVENOUS CONTINUOUS PRN
Status: DISCONTINUED | OUTPATIENT
Start: 2022-05-24 | End: 2022-05-24 | Stop reason: HOSPADM

## 2022-05-24 RX ORDER — SIMETHICONE 20 MG/.3ML
EMULSION ORAL AS NEEDED
Status: DISCONTINUED | OUTPATIENT
Start: 2022-05-24 | End: 2022-05-24 | Stop reason: HOSPADM

## 2022-05-24 RX ORDER — LIDOCAINE HYDROCHLORIDE 20 MG/ML
INJECTION, SOLUTION INTRAVENOUS AS NEEDED
Status: DISCONTINUED | OUTPATIENT
Start: 2022-05-24 | End: 2022-05-24 | Stop reason: SURG

## 2022-05-24 RX ORDER — PROPOFOL 10 MG/ML
INJECTION, EMULSION INTRAVENOUS AS NEEDED
Status: DISCONTINUED | OUTPATIENT
Start: 2022-05-24 | End: 2022-05-24 | Stop reason: SURG

## 2022-05-24 RX ADMIN — PROPOFOL 50 MG: 10 INJECTION, EMULSION INTRAVENOUS at 08:57

## 2022-05-24 RX ADMIN — SODIUM CHLORIDE 70 ML/HR: 9 INJECTION, SOLUTION INTRAVENOUS at 08:31

## 2022-05-24 RX ADMIN — PROPOFOL 100 MG: 10 INJECTION, EMULSION INTRAVENOUS at 08:40

## 2022-05-24 RX ADMIN — LIDOCAINE HYDROCHLORIDE 40 MG: 20 INJECTION, SOLUTION INTRAVENOUS at 08:40

## 2022-05-24 RX ADMIN — PROPOFOL 100 MG: 10 INJECTION, EMULSION INTRAVENOUS at 08:48

## 2022-05-24 NOTE — ANESTHESIA POSTPROCEDURE EVALUATION
Patient: Toribio Dunaway    Procedure Summary     Date: 05/24/22 Room / Location: [facility] ENDOSCOPY 2 / [facility]COPY    Anesthesia Start: 0832 Anesthesia Stop: 0903    Procedure: COLONOSCOPY with polypectomy x 3  (N/A Anus) Diagnosis:       Encounter for screening for malignant neoplasm of colon      (Encounter for screening for malignant neoplasm of colon [Z12.11])    Surgeons: Sal Elmore MD Provider: Jamar Plummer CRNA    Anesthesia Type: MAC ASA Status: 3          Anesthesia Type: MAC    Vitals  No vitals data found for the desired time range.          Post Anesthesia Care and Evaluation    Patient location during evaluation: bedside  Patient participation: complete - patient participated  Level of consciousness: awake and alert  Pain score: 0  Pain management: adequate  Airway patency: patent  Anesthetic complications: No anesthetic complications  PONV Status: none  Cardiovascular status: acceptable  Respiratory status: acceptable  Hydration status: acceptable

## 2022-05-24 NOTE — ANESTHESIA PREPROCEDURE EVALUATION
Anesthesia Evaluation     Patient summary reviewed and Nursing notes reviewed   no history of anesthetic complications:  NPO Solid Status: > 8 hours  NPO Liquid Status: > 8 hours           Airway   Mallampati: II  TM distance: >3 FB  Neck ROM: full  no difficulty expected  Dental - normal exam     Pulmonary - normal exam   (+) pneumonia resolved ,   Cardiovascular - normal exam    Rhythm: regular  Rate: normal    (+) hyperlipidemia,       Neuro/Psych- negative ROS  GI/Hepatic/Renal/Endo    (+)  GERD,  hepatitis, liver disease, renal disease stones,     Musculoskeletal     (+) back pain,   Abdominal    Substance History - negative use     OB/GYN negative ob/gyn ROS         Other   arthritis,      ROS/Med Hx Other: States hx of childhood hepatitis. Thinks it was Hep A.                  Anesthesia Plan    ASA 3     MAC   (Pt told that intravenous sedation will be used as the primary anesthetic along with local anesthesia if necessary. Every effort will be made to make sure the patient is comfortable.     The patient was told they may or may not have recall for the procedure. It was further explained that if the MAC was not adequate that a general anesthetic with either an LMA or endotracheal tube would be required.     Will proceed with the plan of care.)  intravenous induction     Anesthetic plan, all risks, benefits, and alternatives have been provided, discussed and informed consent has been obtained with: patient.        CODE STATUS:

## 2022-05-25 LAB — REF LAB TEST METHOD: NORMAL

## 2022-07-19 PROCEDURE — U0004 COV-19 TEST NON-CDC HGH THRU: HCPCS | Performed by: NURSE PRACTITIONER

## 2023-09-13 ENCOUNTER — TELEPHONE (OUTPATIENT)
Dept: URGENT CARE | Facility: CLINIC | Age: 63
End: 2023-09-13

## 2023-10-03 NOTE — PROGRESS NOTES
Discharge Planning Assessment  [facility]     Patient Name: Toribio Dunaway  MRN: [id number]  Today's Date: 12/15/2018    Admit Date: 12/14/2018    Discharge Needs Assessment     Row Name 12/15/18 1341       Living Environment    Lives With  spouse    Name(s) of Who Lives With Patient  Buffy Dunaway wife [phone]    Current Living Arrangements  home/apartment/condo    Primary Care Provided by  self    Provides Primary Care For  no one    Family Caregiver if Needed  spouse    Quality of Family Relationships  supportive    Able to Return to Prior Arrangements  yes       Resource/Environmental Concerns    Resource/Environmental Concerns  none    Transportation Concerns  car, none       Transition Planning    Patient/Family Anticipates Transition to  home       Discharge Needs Assessment    Equipment Currently Used at Home  none    Anticipated Changes Related to Illness  none    Equipment Needed After Discharge  none        Discharge Plan     Row Name 12/15/18 4305       Plan    Plan  Spoke with patient about discharge plans and his plans are to return home upon discharge.  Confirmed current address and phone numbers.  No POMARÍA Baer is wife [phone] PCP has changed to Dr Venegas.   ADL prior to admission was good and is good now.   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          Destination      No service coordination in this encounter.      Durable Medical Equipment      No service coordination in this encounter.      Dialysis/Infusion      No service coordination in this encounter.      Home Medical Care      No service coordination in this encounter.      Community Resources      No service coordination in this encounter.        Expected Discharge Date and Time     Expected Discharge Date Expected Discharge Time    Dec 18, 2018         Demographic Summary     Row Name 12/15/18 4422       General Information    Admission Type  observation     Drink plenty of fluids.  Rest and take it easy.  Recommend Tylenol/ ibuprofen as needed. Alternate for adequate pain and fever control.  Use throat lozenges/ sprays to soothe throat irritation/ curb coughing.  Recommend hot tea with honey and lemon to soothe.  Warm saltwater gargles every 1-2 hours to ease sore throat   Humidifier will help moisturize the air and loosen mucus and relieve throat pain  Frequent hot showers (inhalation of steam) will also help with throat irritation.    Please call and schedule an appointment with a Primary provider for follow-up with regards to restarting metformin today. Will need to be slowly titrated by your Primary provider. Take the metformin with food.    Please call [phone] to schedule appointment with Family Medicine provider if you prefer to establish with [facility].     Arrived From  emergency department    Referral Source  admission list    Reason for Consult  discharge planning    Preferred Language  English       Contact Information    Permission Granted to Share Info With      Contact Information Obtained for          Functional Status     Row Name 12/15/18 4548       Functional Status    Usual Activity Tolerance  good    Current Activity Tolerance  good       Functional Status, IADL    Medications  independent    Meal Preparation  independent    Housekeeping  independent    Laundry  independent    Shopping  independent       Mental Status    General Appearance WDL  WDL       Mental Status Summary    Recent Changes in Mental Status/Cognitive Functioning  no changes       Employment/    Employment Status  retired        Psychosocial    No documentation.       Abuse/Neglect    No documentation.       Legal    No documentation.       Substance Abuse    No documentation.       Patient Forms    No documentation.           Rosa Stone RN

## (undated) DEVICE — VLV SXN AIR/H2O ORCAPOD3 1P/U STRL

## (undated) DEVICE — ENDOSCOPY PORT CONNECTOR FOR OLYMPUS® SCOPES: Brand: ERBE

## (undated) DEVICE — Device

## (undated) DEVICE — HYBRID TUBING/CAP SET FOR OLYMPUS® SCOPES: Brand: ERBE

## (undated) DEVICE — LUBE JELLY PK/2.75GM STRL BX/144